# Patient Record
Sex: FEMALE | Race: BLACK OR AFRICAN AMERICAN | NOT HISPANIC OR LATINO | URBAN - METROPOLITAN AREA
[De-identification: names, ages, dates, MRNs, and addresses within clinical notes are randomized per-mention and may not be internally consistent; named-entity substitution may affect disease eponyms.]

---

## 2017-05-15 ENCOUNTER — INPATIENT (INPATIENT)
Facility: HOSPITAL | Age: 82
LOS: 2 days | Discharge: HOME | DRG: 440 | End: 2017-05-18
Attending: INTERNAL MEDICINE | Admitting: HOSPITALIST
Payer: MEDICAID

## 2017-05-15 VITALS
OXYGEN SATURATION: 98 % | HEART RATE: 110 BPM | RESPIRATION RATE: 18 BRPM | DIASTOLIC BLOOD PRESSURE: 65 MMHG | SYSTOLIC BLOOD PRESSURE: 136 MMHG | WEIGHT: 149.91 LBS | TEMPERATURE: 99 F

## 2017-05-15 DIAGNOSIS — Z86.69 PERSONAL HISTORY OF OTHER DISEASES OF THE NERVOUS SYSTEM AND SENSE ORGANS: Chronic | ICD-10-CM

## 2017-05-15 DIAGNOSIS — R07.9 CHEST PAIN, UNSPECIFIED: ICD-10-CM

## 2017-05-15 LAB
ALBUMIN SERPL ELPH-MCNC: 3.7 G/DL — SIGNIFICANT CHANGE UP (ref 3.3–5)
ALP SERPL-CCNC: 48 U/L — SIGNIFICANT CHANGE UP (ref 40–120)
ALT FLD-CCNC: 39 U/L — SIGNIFICANT CHANGE UP (ref 12–78)
ANION GAP SERPL CALC-SCNC: 11 MMOL/L — SIGNIFICANT CHANGE UP (ref 5–17)
AST SERPL-CCNC: 34 U/L — SIGNIFICANT CHANGE UP (ref 15–37)
BASOPHILS # BLD AUTO: 0.1 K/UL — SIGNIFICANT CHANGE UP (ref 0–0.2)
BASOPHILS NFR BLD AUTO: 0.9 % — SIGNIFICANT CHANGE UP (ref 0–2)
BILIRUB SERPL-MCNC: 0.6 MG/DL — SIGNIFICANT CHANGE UP (ref 0.2–1.2)
BUN SERPL-MCNC: 29 MG/DL — HIGH (ref 7–23)
CALCIUM SERPL-MCNC: 9.8 MG/DL — SIGNIFICANT CHANGE UP (ref 8.5–10.1)
CHLORIDE SERPL-SCNC: 102 MMOL/L — SIGNIFICANT CHANGE UP (ref 96–108)
CK SERPL-CCNC: 129 U/L — SIGNIFICANT CHANGE UP (ref 26–192)
CO2 SERPL-SCNC: 25 MMOL/L — SIGNIFICANT CHANGE UP (ref 22–31)
CREAT SERPL-MCNC: 0.74 MG/DL — SIGNIFICANT CHANGE UP (ref 0.5–1.3)
EOSINOPHIL # BLD AUTO: 0 K/UL — SIGNIFICANT CHANGE UP (ref 0–0.5)
EOSINOPHIL NFR BLD AUTO: 0.3 % — SIGNIFICANT CHANGE UP (ref 0–6)
GLUCOSE SERPL-MCNC: 105 MG/DL — HIGH (ref 70–99)
HCT VFR BLD CALC: 39.7 % — SIGNIFICANT CHANGE UP (ref 34.5–45)
HGB BLD-MCNC: 12.9 G/DL — SIGNIFICANT CHANGE UP (ref 11.5–15.5)
INR BLD: 1.09 RATIO — SIGNIFICANT CHANGE UP (ref 0.88–1.16)
LIDOCAIN IGE QN: 563 U/L — HIGH (ref 73–393)
LYMPHOCYTES # BLD AUTO: 2.3 K/UL — SIGNIFICANT CHANGE UP (ref 1–3.3)
LYMPHOCYTES # BLD AUTO: 39.3 % — SIGNIFICANT CHANGE UP (ref 13–44)
MCHC RBC-ENTMCNC: 29.3 PG — SIGNIFICANT CHANGE UP (ref 27–34)
MCHC RBC-ENTMCNC: 32.6 GM/DL — SIGNIFICANT CHANGE UP (ref 32–36)
MCV RBC AUTO: 90 FL — SIGNIFICANT CHANGE UP (ref 80–100)
MONOCYTES # BLD AUTO: 0.8 K/UL — SIGNIFICANT CHANGE UP (ref 0–0.9)
MONOCYTES NFR BLD AUTO: 13.2 % — HIGH (ref 1–9)
NEUTROPHILS # BLD AUTO: 2.7 K/UL — SIGNIFICANT CHANGE UP (ref 1.8–7.4)
NEUTROPHILS NFR BLD AUTO: 46.2 % — SIGNIFICANT CHANGE UP (ref 43–77)
PLATELET # BLD AUTO: 177 K/UL — SIGNIFICANT CHANGE UP (ref 150–400)
POTASSIUM SERPL-MCNC: 3.8 MMOL/L — SIGNIFICANT CHANGE UP (ref 3.5–5.3)
POTASSIUM SERPL-SCNC: 3.8 MMOL/L — SIGNIFICANT CHANGE UP (ref 3.5–5.3)
PROT SERPL-MCNC: 7.6 G/DL — SIGNIFICANT CHANGE UP (ref 6–8.3)
PROTHROM AB SERPL-ACNC: 11.9 SEC — SIGNIFICANT CHANGE UP (ref 9.8–12.7)
RBC # BLD: 4.41 M/UL — SIGNIFICANT CHANGE UP (ref 3.8–5.2)
RBC # FLD: 10.4 % — SIGNIFICANT CHANGE UP (ref 10.3–14.5)
SODIUM SERPL-SCNC: 138 MMOL/L — SIGNIFICANT CHANGE UP (ref 135–145)
TROPONIN I SERPL-MCNC: 0.03 NG/ML — SIGNIFICANT CHANGE UP (ref 0.01–0.04)
WBC # BLD: 5.9 K/UL — SIGNIFICANT CHANGE UP (ref 3.8–10.5)
WBC # FLD AUTO: 5.9 K/UL — SIGNIFICANT CHANGE UP (ref 3.8–10.5)

## 2017-05-15 PROCEDURE — 99285 EMERGENCY DEPT VISIT HI MDM: CPT

## 2017-05-15 PROCEDURE — 71275 CT ANGIOGRAPHY CHEST: CPT | Mod: 26

## 2017-05-15 PROCEDURE — 99223 1ST HOSP IP/OBS HIGH 75: CPT | Mod: AI,GC

## 2017-05-15 PROCEDURE — 93010 ELECTROCARDIOGRAM REPORT: CPT

## 2017-05-15 PROCEDURE — 71010: CPT | Mod: 26

## 2017-05-15 PROCEDURE — 74177 CT ABD & PELVIS W/CONTRAST: CPT | Mod: 26

## 2017-05-15 RX ORDER — LISINOPRIL 2.5 MG/1
1 TABLET ORAL
Qty: 0 | Refills: 0 | COMMUNITY

## 2017-05-15 RX ORDER — PREGABALIN 225 MG/1
1000 CAPSULE ORAL DAILY
Qty: 0 | Refills: 0 | Status: DISCONTINUED | OUTPATIENT
Start: 2017-05-15 | End: 2017-05-18

## 2017-05-15 RX ORDER — LISINOPRIL 2.5 MG/1
5 TABLET ORAL DAILY
Qty: 0 | Refills: 0 | Status: DISCONTINUED | OUTPATIENT
Start: 2017-05-15 | End: 2017-05-18

## 2017-05-15 RX ORDER — ERGOCALCIFEROL 1.25 MG/1
1 CAPSULE ORAL
Qty: 0 | Refills: 0 | COMMUNITY

## 2017-05-15 RX ORDER — PREGABALIN 225 MG/1
1 CAPSULE ORAL
Qty: 0 | Refills: 0 | COMMUNITY

## 2017-05-15 NOTE — H&P ADULT - ATTENDING COMMENTS
80yo f pmh stated above being admitted to rule out ACS and rule out pancreatitis.  NPO.  Cardio consulted.  Trend troponins.

## 2017-05-15 NOTE — ED ADULT NURSE NOTE - ED STAT RN HANDOFF DETAILS
hand off report given to Dariel FAROOQ for continuation of care. awaiting for bed placement and admission orders.

## 2017-05-15 NOTE — ED ADULT TRIAGE NOTE - CHIEF COMPLAINT QUOTE
chest pain, shortness of breath and dizziness for one week. patient denies headache, blurred vision, fever, vomiting.

## 2017-05-15 NOTE — ED PROVIDER NOTE - OBJECTIVE STATEMENT
81 female creole speaking, son at the bedside, translates, presents to ER c/o left sided chest pain associated with shortness of breath, abdominal discomfort, decreased appetite, dizziness for the past week, seems to be getting worse.

## 2017-05-15 NOTE — H&P ADULT - ASSESSMENT
80 y/o female with PMHx HTN, HLD, early menopause at age 31, b/l cataracts admitted with generalized abdominal pain and left chest pain possibly 2/2 pancreatitis, r/o ACS.

## 2017-05-15 NOTE — ED ADULT NURSE NOTE - OBJECTIVE STATEMENT
pt came in ED from home with c/o pain in the left side of the chest X 1 week. alert and oriented X 3. Creole speaking, son at the bedside to do the translation. afebrile. non-labored respiration noted. abdomen nondistended, nontender.

## 2017-05-15 NOTE — H&P ADULT - PROBLEM SELECTOR PLAN 3
possibly secondary to pain, as HR increased significantly with abdominal palpation  CT angio was done to rule out PE as cause of tachycardia and MCKENZIE  monitor on telemetry, check repeat EKG in AM

## 2017-05-15 NOTE — ED PROVIDER NOTE - CARE PLAN
Principal Discharge DX:	Chest pain, unspecified type  Secondary Diagnosis:	Tachycardia  Secondary Diagnosis:	Periumbilical abdominal pain

## 2017-05-15 NOTE — H&P ADULT - PROBLEM SELECTOR PLAN 2
patient has history of early menopause and thickened endometrium on CT Abd/Pelvis  consider uterine pathology workup with dedicated imaging  elevated lipase suggestive of pancreatitis, may have trended down due to patient's decreased PO intake and bowel rest over the last week  treat presumptively with NPO, IVF NS at 75 cc/hr for 12hrs  trend amylase and lipase; obtain lipid panel in AM

## 2017-05-15 NOTE — H&P ADULT - NSHPPHYSICALEXAM_GEN_ALL_CORE
General: obese AA female in NAD  HEENT: NC/AT; PERRL, EOMI, +b/l cataracts; neck supple, +JVD, +hepatojugular reflex, +auscultated carotid bruits bilaterally  Cardiac: +S1S2, tachycardic, no murmurs noted  Respiratory: Clear to auscultation bilaterally  Abdomen: soft, nondistended, +TTP periumbilical region with some guarding  Extremities: no clubbing or cyanosis; 1+ pitting edema  MSK: normal motor strength in all extremities, no decreased ROM  Skin: no suspicious lesions or visible rashes noted  Neuro: AAOx3, nonfocal exam; BELL x 4  Psych: appropriate mood and affect

## 2017-05-15 NOTE — H&P ADULT - PROBLEM SELECTOR PLAN 1
admit to TELE  r/o ACS, trend cardiac enzymes; repeat EKG in AM, initial EKG showed sinus tachy  cardiology (Dr. Griggs) consulted f/u recs; consider echocardiogram  unclear if source of pain is cardiac in nature; possibly 2/2 pancreatitis  consider workup of carotid bruits with US carotids admit to TELE  given full dose aspirin x 1  r/o ACS, trend cardiac enzymes; repeat EKG in AM, initial EKG showed sinus tachy  cardiology (Dr. Griggs) consulted f/u recs; consider echocardiogram  unclear if source of pain is cardiac in nature; possibly 2/2 pancreatitis  consider workup of carotid bruits with US carotids

## 2017-05-15 NOTE — H&P ADULT - NSHPSOCIALHISTORY_GEN_ALL_CORE
, lives at home with sons  Ambulates independently; independent of ADLs  Never Smoker  Denies ETOH use    Flu shot: Yes  PNA shot: Yes

## 2017-05-15 NOTE — ED ADULT NURSE NOTE - CHPI ED SYMPTOMS NEG
no shortness of breath/no chills/no vomiting/no diaphoresis/no syncope/no back pain/no nausea/no dizziness/no fever

## 2017-05-16 DIAGNOSIS — K59.00 CONSTIPATION, UNSPECIFIED: ICD-10-CM

## 2017-05-16 DIAGNOSIS — R10.33 PERIUMBILICAL PAIN: ICD-10-CM

## 2017-05-16 DIAGNOSIS — R00.0 TACHYCARDIA, UNSPECIFIED: ICD-10-CM

## 2017-05-16 DIAGNOSIS — I10 ESSENTIAL (PRIMARY) HYPERTENSION: ICD-10-CM

## 2017-05-16 DIAGNOSIS — R07.9 CHEST PAIN, UNSPECIFIED: ICD-10-CM

## 2017-05-16 DIAGNOSIS — Z29.9 ENCOUNTER FOR PROPHYLACTIC MEASURES, UNSPECIFIED: ICD-10-CM

## 2017-05-16 LAB
AMYLASE P1 CFR SERPL: 86 U/L — SIGNIFICANT CHANGE UP (ref 25–115)
ANION GAP SERPL CALC-SCNC: 10 MMOL/L — SIGNIFICANT CHANGE UP (ref 5–17)
BASOPHILS # BLD AUTO: 0 K/UL — SIGNIFICANT CHANGE UP (ref 0–0.2)
BASOPHILS NFR BLD AUTO: 0.7 % — SIGNIFICANT CHANGE UP (ref 0–2)
BUN SERPL-MCNC: 26 MG/DL — HIGH (ref 7–23)
CALCIUM SERPL-MCNC: 9.4 MG/DL — SIGNIFICANT CHANGE UP (ref 8.5–10.1)
CHLORIDE SERPL-SCNC: 104 MMOL/L — SIGNIFICANT CHANGE UP (ref 96–108)
CHOLEST SERPL-MCNC: 121 MG/DL — SIGNIFICANT CHANGE UP (ref 10–199)
CK MB BLD-MCNC: 1.1 % — SIGNIFICANT CHANGE UP (ref 0–3.5)
CK MB BLD-MCNC: 1.2 % — SIGNIFICANT CHANGE UP (ref 0–3.5)
CK MB CFR SERPL CALC: 0.9 NG/ML — SIGNIFICANT CHANGE UP (ref 0–3.6)
CK MB CFR SERPL CALC: 1.2 NG/ML — SIGNIFICANT CHANGE UP (ref 0–3.6)
CK SERPL-CCNC: 101 U/L — SIGNIFICANT CHANGE UP (ref 26–192)
CK SERPL-CCNC: 81 U/L — SIGNIFICANT CHANGE UP (ref 26–192)
CO2 SERPL-SCNC: 28 MMOL/L — SIGNIFICANT CHANGE UP (ref 22–31)
CREAT SERPL-MCNC: 0.74 MG/DL — SIGNIFICANT CHANGE UP (ref 0.5–1.3)
EOSINOPHIL # BLD AUTO: 0 K/UL — SIGNIFICANT CHANGE UP (ref 0–0.5)
EOSINOPHIL NFR BLD AUTO: 0.2 % — SIGNIFICANT CHANGE UP (ref 0–6)
GLUCOSE SERPL-MCNC: 93 MG/DL — SIGNIFICANT CHANGE UP (ref 70–99)
HCT VFR BLD CALC: 36.1 % — SIGNIFICANT CHANGE UP (ref 34.5–45)
HDLC SERPL-MCNC: 39 MG/DL — LOW (ref 40–125)
HGB BLD-MCNC: 11.8 G/DL — SIGNIFICANT CHANGE UP (ref 11.5–15.5)
LIDOCAIN IGE QN: 546 U/L — HIGH (ref 73–393)
LIPID PNL WITH DIRECT LDL SERPL: 67 MG/DL — SIGNIFICANT CHANGE UP
LYMPHOCYTES # BLD AUTO: 1.9 K/UL — SIGNIFICANT CHANGE UP (ref 1–3.3)
LYMPHOCYTES # BLD AUTO: 28.8 % — SIGNIFICANT CHANGE UP (ref 13–44)
MCHC RBC-ENTMCNC: 29 PG — SIGNIFICANT CHANGE UP (ref 27–34)
MCHC RBC-ENTMCNC: 32.6 GM/DL — SIGNIFICANT CHANGE UP (ref 32–36)
MCV RBC AUTO: 88.9 FL — SIGNIFICANT CHANGE UP (ref 80–100)
MONOCYTES # BLD AUTO: 0.7 K/UL — SIGNIFICANT CHANGE UP (ref 0–0.9)
MONOCYTES NFR BLD AUTO: 11.3 % — HIGH (ref 1–9)
NEUTROPHILS # BLD AUTO: 3.9 K/UL — SIGNIFICANT CHANGE UP (ref 1.8–7.4)
NEUTROPHILS NFR BLD AUTO: 58.9 % — SIGNIFICANT CHANGE UP (ref 43–77)
PLATELET # BLD AUTO: 160 K/UL — SIGNIFICANT CHANGE UP (ref 150–400)
POTASSIUM SERPL-MCNC: 3.9 MMOL/L — SIGNIFICANT CHANGE UP (ref 3.5–5.3)
POTASSIUM SERPL-SCNC: 3.9 MMOL/L — SIGNIFICANT CHANGE UP (ref 3.5–5.3)
RBC # BLD: 4.06 M/UL — SIGNIFICANT CHANGE UP (ref 3.8–5.2)
RBC # FLD: 10.6 % — SIGNIFICANT CHANGE UP (ref 10.3–14.5)
SODIUM SERPL-SCNC: 142 MMOL/L — SIGNIFICANT CHANGE UP (ref 135–145)
TOTAL CHOLESTEROL/HDL RATIO MEASUREMENT: 3 RATIO — LOW (ref 3.3–7.1)
TRIGL SERPL-MCNC: 76 MG/DL — SIGNIFICANT CHANGE UP (ref 10–149)
TROPONIN I SERPL-MCNC: 0.04 NG/ML — SIGNIFICANT CHANGE UP (ref 0.01–0.04)
TROPONIN I SERPL-MCNC: 0.05 NG/ML — HIGH (ref 0.01–0.04)
WBC # BLD: 6.6 K/UL — SIGNIFICANT CHANGE UP (ref 3.8–10.5)
WBC # FLD AUTO: 6.6 K/UL — SIGNIFICANT CHANGE UP (ref 3.8–10.5)

## 2017-05-16 PROCEDURE — 76705 ECHO EXAM OF ABDOMEN: CPT | Mod: 26

## 2017-05-16 PROCEDURE — 99233 SBSQ HOSP IP/OBS HIGH 50: CPT | Mod: GC

## 2017-05-16 PROCEDURE — 99255 IP/OBS CONSLTJ NEW/EST HI 80: CPT

## 2017-05-16 PROCEDURE — 93010 ELECTROCARDIOGRAM REPORT: CPT

## 2017-05-16 PROCEDURE — 74183 MRI ABD W/O CNTR FLWD CNTR: CPT | Mod: 26

## 2017-05-16 RX ORDER — ENOXAPARIN SODIUM 100 MG/ML
40 INJECTION SUBCUTANEOUS EVERY 24 HOURS
Qty: 0 | Refills: 0 | Status: DISCONTINUED | OUTPATIENT
Start: 2017-05-16 | End: 2017-05-18

## 2017-05-16 RX ORDER — ACETAMINOPHEN 500 MG
650 TABLET ORAL EVERY 6 HOURS
Qty: 0 | Refills: 0 | Status: DISCONTINUED | OUTPATIENT
Start: 2017-05-16 | End: 2017-05-18

## 2017-05-16 RX ORDER — DOCUSATE SODIUM 100 MG
100 CAPSULE ORAL DAILY
Qty: 0 | Refills: 0 | Status: DISCONTINUED | OUTPATIENT
Start: 2017-05-16 | End: 2017-05-18

## 2017-05-16 RX ORDER — ASPIRIN/CALCIUM CARB/MAGNESIUM 324 MG
325 TABLET ORAL ONCE
Qty: 0 | Refills: 0 | Status: COMPLETED | OUTPATIENT
Start: 2017-05-16 | End: 2017-05-16

## 2017-05-16 RX ORDER — ASPIRIN/CALCIUM CARB/MAGNESIUM 324 MG
81 TABLET ORAL DAILY
Qty: 0 | Refills: 0 | Status: DISCONTINUED | OUTPATIENT
Start: 2017-05-16 | End: 2017-05-18

## 2017-05-16 RX ORDER — SODIUM CHLORIDE 9 MG/ML
1000 INJECTION, SOLUTION INTRAVENOUS
Qty: 0 | Refills: 0 | Status: DISCONTINUED | OUTPATIENT
Start: 2017-05-16 | End: 2017-05-17

## 2017-05-16 RX ORDER — SODIUM CHLORIDE 9 MG/ML
1000 INJECTION INTRAMUSCULAR; INTRAVENOUS; SUBCUTANEOUS
Qty: 0 | Refills: 0 | Status: DISCONTINUED | OUTPATIENT
Start: 2017-05-16 | End: 2017-05-16

## 2017-05-16 RX ORDER — POLYETHYLENE GLYCOL 3350 17 G/17G
17 POWDER, FOR SOLUTION ORAL DAILY
Qty: 0 | Refills: 0 | Status: DISCONTINUED | OUTPATIENT
Start: 2017-05-16 | End: 2017-05-18

## 2017-05-16 RX ADMIN — ENOXAPARIN SODIUM 40 MILLIGRAM(S): 100 INJECTION SUBCUTANEOUS at 12:34

## 2017-05-16 RX ADMIN — POLYETHYLENE GLYCOL 3350 17 GRAM(S): 17 POWDER, FOR SOLUTION ORAL at 12:23

## 2017-05-16 RX ADMIN — SODIUM CHLORIDE 150 MILLILITER(S): 9 INJECTION, SOLUTION INTRAVENOUS at 19:51

## 2017-05-16 RX ADMIN — SODIUM CHLORIDE 75 MILLILITER(S): 9 INJECTION INTRAMUSCULAR; INTRAVENOUS; SUBCUTANEOUS at 01:21

## 2017-05-16 RX ADMIN — LISINOPRIL 5 MILLIGRAM(S): 2.5 TABLET ORAL at 06:46

## 2017-05-16 RX ADMIN — Medication 81 MILLIGRAM(S): at 12:34

## 2017-05-16 RX ADMIN — SODIUM CHLORIDE 150 MILLILITER(S): 9 INJECTION, SOLUTION INTRAVENOUS at 12:24

## 2017-05-16 RX ADMIN — Medication 100 MILLIGRAM(S): at 12:23

## 2017-05-16 RX ADMIN — Medication 325 MILLIGRAM(S): at 00:32

## 2017-05-16 RX ADMIN — PREGABALIN 1000 MICROGRAM(S): 225 CAPSULE ORAL at 12:23

## 2017-05-16 NOTE — DISCHARGE NOTE ADULT - PLAN OF CARE
Resolution Control Blood Pressure levels Control Cholesterol levels Prevent symptoms Continue with metoprolol and hydrochlorothiazide. Follow up with your Primary care doctor. Low fat, DASH diet. Follow up with your Primary care doctor. Continue with metoprolol. Follow up with your Primary care doctor. Encourage low fat diet, avoid alcoholic beverages. Follow up with Gastroenterologist, Dr. Martins. Unlikely cardiac source, cardiac enzymes trended down. Follow up with your Primary Care Doctor. Possible source of tachycardia. Resolved Continue with lisinopril. Follow up with your Primary care doctor. Low fat, DASH diet. Follow up with your Primary care doctor.  start lipitor 10mg daily at night likely 2/2 to hyperthyoidism  Continue with metoprolol 25mg daily. Follow up with your Primary care doctor. likely source tachycardia.  cont methimazole 20mg daily  cont metoprolol 25mg daily  recheck thyroid tests 2-3 weeks after discharge  f/u at [facility] for radioactive scan as outpatient You were found to have thickening of the lining of your uterus on a CT scan. Follow up with your PMD to set you up with a gynecologist for a possible transvaginal ultrasound and/or for an endometrial biopsy in the office. Encourage low fat diet, avoid alcoholic beverages. Follow up with Gastroenterologist, Dr. Martins (phone number below) within 1-2 weeks for your elevated lipase level and possibly to evaluate your stomach. Continue famotidine until that visit and readdress with him if need to continue. likely source of tachycardia  cont methimazole 20mg daily  cont metoprolol 25mg daily  recheck thyroid tests 2-3 weeks after discharge  follow up at [facility] for your hyperthyroidism. Call [phone] for an appointment. You will need a radioactive iodine scan as outpatient and also to recheck your thyroid function blood tests in 2-3 weeks. Take the new medications metoprolol 25mg daily and methimazole 20mg daily starting tomorrow 05/18/17. Low fat, low cholesterol diet. Follow up with your Primary care doctor.  start lipitor 10mg daily at night Unlikely cardiac source, cardiac enzymes negative. Follow up with your Primary Care Doctor. You have some tightening in your left internal carotid artery (likely ~50%). Start taking atorvastatin and aspirin as prescribed. Follow up with your primary care doctor within a week.

## 2017-05-16 NOTE — DISCHARGE NOTE ADULT - PATIENT PORTAL LINK FT
“You can access the [facility] Patient Portal, offered by [facility], by registering with the following website: [email or web address]”

## 2017-05-16 NOTE — PROGRESS NOTE ADULT - PROBLEM SELECTOR PLAN 1
-admit to tele to monitor  -pt still in sinus tachycardia  -CE downtrended  -f/u cardio recs  -f/u TTE -admit to tele to monitor  -resolved since admission  -pt still in sinus tachycardia  -CE downtrended  -f/u cardio recs  -f/u TTE report -admit to tele to monitor  -resolved since admission  -pt still in sinus tachycardia likely 2/2 dehydration  -CE negative  -f/u cardio recs  -f/u TTE report

## 2017-05-16 NOTE — PROGRESS NOTE ADULT - SUBJECTIVE AND OBJECTIVE BOX
Patient is a 81y old  Female who presents with a chief complaint of abdominal pain/chest pain (16 May 2017 07:47)      INTERVAL HPI/OVERNIGHT EVENTS: Pt reports her chest pain has resolved. Pt states she currently has no abdominal pain at rest. Pt admits to decreased PO intake for the last week, and no BM for the last few days. Pt denies SOB. Per tele monitor, pt has been in sinus tachycardia, with the highest HR up to 140-150s while she goes to the bathroom.    MEDICATIONS  (STANDING):  lisinopril 5milliGRAM(s) Oral daily  cyanocobalamin 1000MICROGram(s) Oral daily  enoxaparin Injectable 40milliGRAM(s) SubCutaneous every 24 hours  aspirin enteric coated 81milliGRAM(s) Oral daily  lactated ringers. 1000milliLiter(s) IV Continuous <Continuous>  docusate sodium 100milliGRAM(s) Oral daily  polyethylene glycol 3350 17Gram(s) Oral daily    MEDICATIONS  (PRN):  acetaminophen   Tablet. 650milliGRAM(s) Oral every 6 hours PRN Mild Pain (1 - 3)      Allergies    No Known Allergies    Intolerances        REVIEW OF SYSTEMS:  CONSTITUTIONAL: No fever, no chills, no myalgias  HEENT: no HA, no sore throat, no blurry vision  RESPIRATORY: No cough, no wheezing, no SOB  CARDIOVASCULAR: No chest pain, no palpitations  GASTROINTESTINAL: No abdominal pain, no nausea, no vomiting; no diarrhea, +constipation.   GENITOURINARY: No dysuria, No hematuria.   EXT: no edema, no cyanosis, no open wounds, no calf tenderness  NEUROLOGICAL: alert and oriented x 3,  No headaches  MUSCULOSKELETAL: no body aches or joint pain  ROS  [ ] Unable to obtain     Height (cm): 157.5 (05-16 @ 02:35)  Weight (kg): 60 (05-16 @ 02:35)  BMI (kg/m2): 24.2 (05-16 @ 02:35)  BSA (m2): 1.6 (05-16 @ 02:35)  Vital Signs Last 24 Hrs  T(C): 37, Max: 37.1 (05-15 @ 17:55)  T(F): 98.6, Max: 98.8 (05-15 @ 17:55)  HR: 101 (98 - 110)  BP: 130/70 (113/63 - 143/72)  BP(mean): --  RR: 17 (15 - 18)  SpO2: 93% (93% - 98%)      PHYSICAL EXAM:  GENERAL:  No acute distresss, well appearing, [ ] Agitated, [ ] Lethargy, [ ] confused   ENMT: PERRL, EOMI, conjuctiva clear  NEURO:  Alert & Oriented X3, no focal deficits [ ]Confusion  [ ] Encephalopathic [ ] Sedated [ ] Other  RESPIRATORY: Clear to auscultation bilaterally,  [ ] decreased breath sounds at bases  [ ] wheezing   [ ] rhonchi  [ ] crackles  CARDIOVASCULAR: RRR, No murmurs, +S1, S2  [ ] irregular   ABDOMEN:  soft, bloated, +mild distention at epigastric region, +BS x 4  EXTREMITIES: no edema or cyanosis      LABS:                        11.8   6.6   )-----------( 160      ( 16 May 2017 05:58 )             36.1     16 May 2017 05:58    142    |  104    |  26     ----------------------------<  93     3.9     |  28     |  0.74     Ca    9.4        16 May 2017 05:58    TPro  7.6    /  Alb  3.7    /  TBili  0.6    /  DBili  x      /  AST  34     /  ALT  39     /  AlkPhos  48     15 May 2017 18:53    PT/INR - ( 15 May 2017 18:53 )   PT: 11.9 sec;   INR: 1.09 ratio               CAPILLARY BLOOD GLUCOSE    Cultures          RADIOLOGY & ADDITIONAL TESTS: personally reviewed.  CONSULTANT NOTES: personally reviewed, appreciate recommendations to coordinate plan of care.      Care Discussed with: [X ] Patient  [ ] Family  [X] RN  DVT prophylaxis: [ X] SQ Lovenox   [ ] SQ Heparin  [ ] Coumadin  [ ] SCDs [ ] ambulating frequently/pt low risk [ ] contraindicated            [ ] other Patient is a 81y old  Female who presents with a chief complaint of abdominal pain/chest pain (16 May 2017 07:47)      INTERVAL HPI/OVERNIGHT EVENTS: Pt reports her chest pain has resolved. Pt states she currently has no abdominal pain at rest. Pt admits to decreased PO intake for the last week, and no BM for the last few days. Pt denies SOB. Per tele monitor, pt has been in sinus tachycardia, with the highest HR up to 140-150s while she goes to the bathroom.    MEDICATIONS  (STANDING):  lisinopril 5milliGRAM(s) Oral daily  cyanocobalamin 1000MICROGram(s) Oral daily  enoxaparin Injectable 40milliGRAM(s) SubCutaneous every 24 hours  aspirin enteric coated 81milliGRAM(s) Oral daily  lactated ringers. 1000milliLiter(s) IV Continuous <Continuous>  docusate sodium 100milliGRAM(s) Oral daily  polyethylene glycol 3350 17Gram(s) Oral daily    MEDICATIONS  (PRN):  acetaminophen   Tablet. 650milliGRAM(s) Oral every 6 hours PRN Mild Pain (1 - 3)      Allergies    No Known Allergies    Intolerances        REVIEW OF SYSTEMS:  CONSTITUTIONAL: No fever, no chills, no myalgias  HEENT: no HA, no sore throat, no blurry vision  RESPIRATORY: No cough, no wheezing, no SOB  CARDIOVASCULAR: No chest pain, no palpitations  GASTROINTESTINAL: Had abd pain until yesterday evening. Today no abdominal pain, no nausea, no vomiting; no diarrhea, +constipation.   GENITOURINARY: No dysuria, No hematuria.   EXT: no edema, no cyanosis, no open wounds, no calf tenderness  NEUROLOGICAL:  No headaches  MUSCULOSKELETAL: no body aches or joint pain  ROS  [ ] Unable to obtain     Height (cm): 157.5 (05-16 @ 02:35)  Weight (kg): 60 (05-16 @ 02:35)  BMI (kg/m2): 24.2 (05-16 @ 02:35)  BSA (m2): 1.6 (05-16 @ 02:35)  Vital Signs Last 24 Hrs  T(C): 37, Max: 37.1 (05-15 @ 17:55)  T(F): 98.6, Max: 98.8 (05-15 @ 17:55)  HR: 101 (98 - 110)  BP: 130/70 (113/63 - 143/72)  BP(mean): --  RR: 17 (15 - 18)  SpO2: 93% (93% - 98%)      PHYSICAL EXAM:  GENERAL:  No acute distress, [ ] Agitated, [ ] Lethargy, [ ] confused   ENMT: PERRL, EOMI, conjunctiva clear  NEURO:  answering questions and following commands appropriately, no focal deficits [ ]Confusion  [ ] Encephalopathic [ ] Sedated [ ] Other  RESPIRATORY: Clear to auscultation bilaterally,  [ ] decreased breath sounds at bases  [ ] wheezing   [ ] rhonchi  [ ] crackles  CARDIOVASCULAR: RRR, No murmurs, +S1, S2  [ ] irregular   ABDOMEN:  soft, +mild tenderness to palpation at epigastric region, +BS x 4  EXTREMITIES: no edema or cyanosis      LABS:                        11.8   6.6   )-----------( 160      ( 16 May 2017 05:58 )             36.1     16 May 2017 05:58    142    |  104    |  26     ----------------------------<  93     3.9     |  28     |  0.74     Ca    9.4        16 May 2017 05:58    TPro  7.6    /  Alb  3.7    /  TBili  0.6    /  DBili  x      /  AST  34     /  ALT  39     /  AlkPhos  48     15 May 2017 18:53    PT/INR - ( 15 May 2017 18:53 )   PT: 11.9 sec;   INR: 1.09 ratio         RADIOLOGY & ADDITIONAL TESTS: personally reviewed.  CONSULTANT NOTES: personally reviewed, appreciate recommendations to coordinate plan of care.      Care Discussed with: [X ] Patient  [ ] Family  [X] RN  DVT prophylaxis: [ X] SQ Lovenox   [ ] SQ Heparin  [ ] Coumadin  [ ] SCDs [ ] ambulating frequently/pt low risk [ ] contraindicated            [ ] other

## 2017-05-16 NOTE — DISCHARGE NOTE ADULT - ADDITIONAL INSTRUCTIONS
-f/u with [facility] at [phone] for radioactive scan as outpatient  -recheck TFTs 2-3 weeks after discharge  -you are now started on metoprolol 25mg daily and methimazole 20mg daily for hyperthyroidism  -you are started on lipitor 10mg daily at night for high cholesterol  -f/u outpatient with GI-get CMP and lipase checked in 1 week  -f/u with outpatient OB/GYN for thickened endometrium Pulmonary hypertension: you have moderate to severe pulmonary hypertension as per echocardiogram done in the hospital. Follow up with your PMD within a week.

## 2017-05-16 NOTE — DISCHARGE NOTE ADULT - CARE PROVIDER_API CALL
Bharati Malhotra- Dr. Bharati Malhotra  Phone: (   )    -  Fax: (   )    - Bharati Malhotra  PMGEORGE- Dr. Bharati Malhotra  Phone: (   )    -  Fax: (   )    -    Marty Martins (DO), Gastroenterology; Internal Medicine  [address] [place] [address]  Phone: [phone]  Fax: [phone]

## 2017-05-16 NOTE — DISCHARGE NOTE ADULT - HOSPITAL COURSE
80 y/o female with PMHx HTN, HLD, early menopause at age 31, b/l cataracts presented with h/o one week prior to admission generalized abdominal pain and left chest pain. Admited to dyspnea on exertion while walking up and down the stairs. Chest pain was non-radiating and 6/10 in intensity. Had chronic left shoulder pain. Patient's son (Adarsh) was at bedside translating and also stated that patient had decreased appetite which improved the day prior to admission but returned on day of admission. Patient stated that abdominal discomfort improved with burping. Had one episode of NBNB vomiting 4 days prior to admission. Admited to a sensation of a pulsatile thumping in her left ear. Denied palpitations, nausea, diarrhea, vaginal bleeding, BRBPR, melena.    In the ED, patient's labs significant for elevated lipase of 593, first set CE's neg. EKG showed sinus tachycardia. CXR showed no focal consolidation or pleural effusion. CT Angio Chest was done to r/o PE: showed no central PE. CT abd/pelvis showed thickened endometrium, measuring 1.4 cm.    In Hospital Course, Cardio - Dr. MIAH Delong, was consulted. Pt had an initial increase in troponin from 0.028 to 0.052, but then it trended down from 0.052 to 0.043. 82 y/o female with PMHx HTN, HLD, early menopause at age 31, b/l cataracts presented with h/o one week prior to admission generalized abdominal pain and left chest pain. Admited to dyspnea on exertion while walking up and down the stairs. Chest pain was non-radiating and 6/10 in intensity. Had chronic left shoulder pain. Patient's son (Adarsh) was at bedside translating and also stated that patient had decreased appetite which improved the day prior to admission but returned on day of admission. Patient stated that abdominal discomfort improved with burping. Had one episode of NBNB vomiting 4 days prior to admission. Admited to a sensation of a pulsatile thumping in her left ear. Denied palpitations, nausea, diarrhea, vaginal bleeding, BRBPR, melena.    In the ED, patient's labs significant for elevated lipase of 593, first set CE's neg. EKG showed sinus tachycardia. CXR showed no focal consolidation or pleural effusion. CT Angio Chest was done to r/o PE: showed no central PE. CT abd/pelvis showed thickened endometrium, measuring 1.4 cm.    In Hospital Course, Cardio - Dr. MIAH Delong, was consulted. Pt had an initial increase in troponin from 0.028 to 0.052, but then it trended down from 0.052 to 0.043. Pt was also seen by Gastroenterologist, Dr. Martins, for pancreatitis.     5/16/17 US gallbladder -  Unremarkable gallbladder ultrasound. Pt also had MRI abdomen showing: No common bile duct calculus demonstrated, No MR evidence for pancreatitis, Innumerable subcentimeter probably benign splenic lesions of uncertain etiology.    5/17/17 Pt outpatient PMD notified Hospitalist team that pt had elevated Thyroid function results on outpatient labs, which may be a probable source of tachycardia for patient. Endocrinologist, Dr. Perlman, was consulted. 80 y/o female with PMHx HTN, HLD, early menopause at age 31, b/l cataracts presented with h/o one week prior to admission generalized abdominal pain and left chest pain. Admited to dyspnea on exertion while walking up and down the stairs. Chest pain was non-radiating and 6/10 in intensity. Had chronic left shoulder pain. Patient's son (Adarsh) was at bedside translating and also stated that patient had decreased appetite which improved the day prior to admission but returned on day of admission. Patient stated that abdominal discomfort improved with burping. Had one episode of NBNB vomiting 4 days prior to admission. Admited to a sensation of a pulsatile thumping in her left ear. Denied palpitations, nausea, diarrhea, vaginal bleeding, BRBPR, melena.    In the ED, patient's labs significant for elevated lipase of 593, first set CE's neg. EKG showed sinus tachycardia. CXR showed no focal consolidation or pleural effusion. CT Angio Chest was done to r/o PE: showed no central PE. CT abd/pelvis showed thickened endometrium, measuring 1.4 cm.    In Hospital Course, Cardio - Dr. MIAH Delong, was consulted. Pt had an initial increase in troponin from 0.028 to 0.052, but then it trended down from 0.052 to 0.043. Pt was also seen by Gastroenterologist, Dr. Martins, for pancreatitis. Lipase elevated around 500s, but no evidence of pancreatitis on MRCP or CT Abd/Pelvis.     5/16/17 US gallbladder -  Unremarkable gallbladder ultrasound. Pt also had MRI abdomen showing: No common bile duct calculus demonstrated, No MR evidence for pancreatitis, Innumerable subcentimeter probably benign splenic lesions of uncertain etiology.    5/17/17 Pt outpatient PMD notified Hospitalist team that pt had elevated Thyroid function results on outpatient labs, which may be a probable source of tachycardia for patient. Endocrinologist, Dr. Perlman, was consulted. Pt was started on methimazole 20mg daily and metoprolol 25mg daily for hyperthyroidism. TSH here was < 0.01. Free T4 was 4.3. and Total T3 was 279. Patient will follow up outpatient for a radioactive iodine scan of thyroid. US of thyroid and parathyroid was unremarkable.    US Carotids showed Left ICA stenosis of 50-70%. Patient was treated with medical management with lipitor 10mg daily.  TTE was done and official report said *******    Patient stable for discharge to home without any needs.  On day of discharge, VS: T97.2, HR90, /70, RR17, O296% on RA  Labs sig for lipase of 693 and abnormal thyroid studies as mentioned above. Patient shows no clinical signs of pancreatitis, as she has no abdominal pain and is tolerating a regular diet.   Physical Exam:  Gen: WDWN in NAD  CV: RRR, no murmurs  Resp: CTAB, no wheezing  Abd: Soft, NT, ND, +BSx4  Neuro: A+Ox3 per family members, pt responsive to commands  Ext: No edema, no cyanosis 82 y/o female with PMHx HTN, HLD, early menopause at age 31, b/l cataracts presented with h/o one week prior to admission generalized abdominal pain and left chest pain. Admited to dyspnea on exertion while walking up and down the stairs. Chest pain was non-radiating and 6/10 in intensity. Had chronic left shoulder pain. Patient's son (Adarsh) was at bedside translating and also stated that patient had decreased appetite which improved the day prior to admission but returned on day of admission. Patient stated that abdominal discomfort improved with burping. Had one episode of NBNB vomiting 4 days prior to admission. Admited to a sensation of a pulsatile thumping in her left ear. Denied palpitations, nausea, diarrhea, vaginal bleeding, BRBPR, melena.    In the ED, patient's labs significant for elevated lipase of 593, first set CE's neg. EKG showed sinus tachycardia. CXR showed no focal consolidation or pleural effusion. CT Angio Chest was done to r/o PE: showed no central PE. CT abd/pelvis showed thickened endometrium, measuring 1.4 cm.    In Hospital Course, Cardio - Dr. MIAH Delong, was consulted. Pt had an initial increase in troponin from 0.028 to 0.052, but then it trended down from 0.052 to 0.043. Pt was also seen by Gastroenterologist, Dr. Martins, for pancreatitis. Lipase elevated around 500s, but no evidence of pancreatitis on MRCP or CT Abd/Pelvis.     5/16/17 US gallbladder -  Unremarkable gallbladder ultrasound. Pt also had MRI abdomen showing: No common bile duct calculus demonstrated, No MR evidence for pancreatitis, Innumerable subcentimeter probably benign splenic lesions of uncertain etiology.    5/17/17 Pt outpatient PMD notified Hospitalist team that pt had elevated Thyroid function results on outpatient labs, which may be a probable source of tachycardia for patient. Endocrinologist, Dr. Perlman, was consulted. Pt was started on methimazole 20mg daily and metoprolol 25mg daily for hyperthyroidism. TSH here was < 0.01. Free T4 was 4.3. and Total T3 was 279. Patient will follow up outpatient for a radioactive iodine scan of thyroid. US of thyroid and parathyroid was unremarkable.    US Carotids showed Left ICA stenosis of 50-70%. Patient was treated with medical management with lipitor 10mg daily.  TTE was done and official report said *******    Patient stable for discharge to home without any needs. ID  [id number]  On day of discharge, VS: T97.2, HR90, /70, RR17, O296% on RA  Labs sig for lipase of 693 and abnormal thyroid studies as mentioned above. Patient shows no clinical signs of pancreatitis, as she has no abdominal pain and is tolerating a regular diet.   Physical Exam:  Gen: WDWN in NAD  CV: RRR, no murmurs  Resp: CTAB, no wheezing  Abd: Soft, NT, mild epigastric pain on palpation, +BSx4  Neuro: A+Ox3 per family members, pt responsive to commands  Ext: No edema, no cyanosis 82 y/o female with PMHx HTN, HLD, early menopause at age 31, b/l cataracts presented with h/o one week prior to admission generalized abdominal pain and left chest pain. Admited to dyspnea on exertion while walking up and down the stairs. Chest pain was non-radiating and 6/10 in intensity. Had chronic left shoulder pain. Patient's son (Adarsh) was at bedside translating and also stated that patient had decreased appetite which improved the day prior to admission but returned on day of admission. Patient stated that abdominal discomfort improved with burping. Had one episode of NBNB vomiting 4 days prior to admission. Admited to a sensation of a pulsatile thumping in her left ear. Denied palpitations, nausea, diarrhea, vaginal bleeding, BRBPR, melena.    In the ED, patient's labs significant for elevated lipase of 593, first set CE's neg. EKG showed sinus tachycardia. CXR showed no focal consolidation or pleural effusion. CT Angio Chest was done to r/o PE: showed no central PE. CT abd/pelvis showed thickened endometrium, measuring 1.4 cm.    In Hospital Course, Cardio - Dr. MIAH Delong, was consulted. Pt had an initial increase in troponin from 0.028 to 0.052, but then it trended down from 0.052 to 0.043. Pt was also seen by Gastroenterologist, Dr. Martnis, for pancreatitis. Lipase elevated around 500s, but no evidence of pancreatitis on MRCP or CT Abd/Pelvis.     5/16/17 US gallbladder -  Unremarkable gallbladder ultrasound. Pt also had MRI abdomen showing: No common bile duct calculus demonstrated, No MR evidence for pancreatitis, Innumerable subcentimeter probably benign splenic lesions of uncertain etiology.    5/17/17 Pt outpatient PMD notified Hospitalist team that pt had elevated Thyroid function results on outpatient labs, which may be a probable source of tachycardia for patient. Endocrinologist, Dr. Perlman, was consulted. Pt was started on methimazole 20mg daily and metoprolol 25mg daily for hyperthyroidism. TSH here was < 0.01. Free T4 was 4.3. and Total T3 was 279. Patient will follow up outpatient for a radioactive iodine scan of thyroid. US of thyroid and parathyroid was unremarkable.    US Carotids showed Left ICA stenosis of 50-70%. Patient was treated with medical management with lipitor 10mg daily.  TTE was done and official report said *******    Patient had regular bowel movement prior to discharge. Patient stable for discharge to home without any needs. ID  [id number]  On day of discharge, VS: T97.2, HR90, /70, RR17, O296% on RA  Labs sig for lipase of 693 and abnormal thyroid studies as mentioned above. Patient shows no clinical signs of pancreatitis, as she has no abdominal pain and is tolerating a regular diet.   Physical Exam:  Gen: WDWN in NAD  CV: RRR, no murmurs  Resp: CTAB, no wheezing  Abd: Soft, NT, mild epigastric pain on palpation, +BSx4  Neuro: A+Ox3 per family members, pt responsive to commands  Ext: No edema, no cyanosis HPI:  80 y/o female with PMHx HTN, HLD, early menopause at age 31, b/l cataracts presented with h/o one week prior to admission generalized abdominal pain and left chest pain. Admited to dyspnea on exertion while walking up and down the stairs. Chest pain was non-radiating and 6/10 in intensity. Had chronic left shoulder pain. Patient's son (Adarsh) was at bedside translating and also stated that patient had decreased appetite which improved the day prior to admission but returned on day of admission. Patient stated that abdominal discomfort improved with burping. Had one episode of NBNB vomiting 4 days prior to admission. Admited to a sensation of a pulsatile thumping in her left ear. Denied palpitations, nausea, diarrhea, vaginal bleeding, BRBPR, melena.    Hospital Course:  In the ED, patient's labs significant for elevated lipase of 593, first set CE's neg. EKG showed sinus tachycardia. CXR showed no focal consolidation or pleural effusion. In the ER, CT Angio Chest was done to r/o PE: showed no central PE. CT abd/pelvis had incidental finding of thickened endometrium, measuring 1.4 cm, which pt will f/up as outpatient.     Cardio - Dr. MIAH Delong, was consulted in the ER. Pt had negative cardiac enzymes. Pt was also seen by Gastroenterologist, Dr. Martins, for likely resolving pancreatitis. Lipase elevated around 500-600s, but no evidence of pancreatitis on MRCP or CT Abd/Pelvis. RUQ US showed normal gallbladder without any gallstones or biliary sludge noted. Triglycerides normal. Pt does not drink EtOH. Unclear etiology if pt did have pancreatitis. IGG-4 pending. Perhaps has non-bleeding PUD. Started on famotidine and will f/up with gastroenterology in the office to f/up on elevated lipase and also for potential PUD w/up. Diet advanced and pt tolerate regular diet well without abdominal pain.     MRCP results: No common bile duct calculus demonstrated, No MR evidence for pancreatitis, Innumerable subcentimeter probably benign splenic lesions of uncertain etiology.    5/17/17 Pt's outpatient PMD notified Hospitalist team that pt had elevated Thyroid function results on outpatient labs which were drawn just prior to admission, which at that point after rehydration was likely etiology of persistent sinus tachycardia. Endocrinologist, Dr. Perlman, was consulted. Pt was started on methimazole 20mg daily and metoprolol 25mg daily for hyperthyroidism. TSH here was < 0.01. Free T4 was 4.3. and Total T3 was 279. Patient will follow up outpatient for a radioactive iodine scan of thyroid. US of thyroid and parathyroid was unremarkable.    US Carotids showed Left ICA stenosis of 50-70%. Patient was started on medical management with lipitor and aspirin.  TTE was done and showed: Overall normal left ventricular function. Moderate to severe pulmonary hypertension. Normal right ventricular size and function.    Patient had regular bowel movement prior to discharge. Patient stable for discharge to home without any needs. ID  [id number]    On day of discharge, ROS: denies abd pain, nausea, fever, chills, CP, palpitations, SOB.   Physical Exam:  Gen: NAD  CV: RRR, S1, S2, no murmurs  Resp: CTAB, no wheezing  Abd: Soft, non-distended, non-tender, +BSx4  Neuro: A+Ox3 per family members, pt responsive to commands/questions  Ext: No edema, no cyanosis    Time spent on discharge: 45 min    Spoke to PMD, Dr. Malhotra, regarding admission and hospital course.

## 2017-05-16 NOTE — DISCHARGE NOTE ADULT - CARE PLAN
Principal Discharge DX:	Chest pain, unspecified type  Goal:	Resolution  Secondary Diagnosis:	Tachycardia  Goal:	Resolution  Instructions for follow-up, activity and diet:	Continue with metoprolol. Follow up with your Primary care doctor.  Secondary Diagnosis:	Hypertension  Goal:	Control Blood Pressure levels  Instructions for follow-up, activity and diet:	Continue with metoprolol and hydrochlorothiazide. Follow up with your Primary care doctor.  Secondary Diagnosis:	Hyperlipidemia  Goal:	Control Cholesterol levels  Instructions for follow-up, activity and diet:	Low fat, DASH diet. Follow up with your Primary care doctor.  Secondary Diagnosis:	Cataracts, bilateral  Goal:	Prevent symptoms Principal Discharge DX:	Pancreatitis  Goal:	Resolution  Secondary Diagnosis:	Tachycardia  Goal:	Resolution  Instructions for follow-up, activity and diet:	Continue with metoprolol. Follow up with your Primary care doctor.  Secondary Diagnosis:	Hypertension  Goal:	Control Blood Pressure levels  Instructions for follow-up, activity and diet:	Continue with metoprolol and hydrochlorothiazide. Follow up with your Primary care doctor.  Secondary Diagnosis:	Hyperlipidemia  Goal:	Control Cholesterol levels  Instructions for follow-up, activity and diet:	Low fat, DASH diet. Follow up with your Primary care doctor.  Secondary Diagnosis:	Cataracts, bilateral  Goal:	Prevent symptoms Principal Discharge DX:	Pancreatitis  Goal:	Resolution  Instructions for follow-up, activity and diet:	Encourage low fat diet, avoid alcoholic beverages. Follow up with Gastroenterologist, Dr. Martins.  Secondary Diagnosis:	Tachycardia  Goal:	Resolution  Instructions for follow-up, activity and diet:	Continue with metoprolol. Follow up with your Primary care doctor.  Secondary Diagnosis:	Hypertension  Goal:	Control Blood Pressure levels  Instructions for follow-up, activity and diet:	Continue with metoprolol and hydrochlorothiazide. Follow up with your Primary care doctor.  Secondary Diagnosis:	Hyperlipidemia  Goal:	Control Cholesterol levels  Instructions for follow-up, activity and diet:	Low fat, DASH diet. Follow up with your Primary care doctor.  Secondary Diagnosis:	Chest pain, unspecified type  Goal:	Resolved  Instructions for follow-up, activity and diet:	Unlikely cardiac source, cardiac enzymes trended down. Follow up with your Primary Care Doctor.  Secondary Diagnosis:	Hyperthyroidism  Goal:	Prevent symptoms  Instructions for follow-up, activity and diet:	Possible source of tachycardia. Principal Discharge DX:	Pancreatitis  Goal:	Resolution  Instructions for follow-up, activity and diet:	Encourage low fat diet, avoid alcoholic beverages. Follow up with Gastroenterologist, Dr. Martins.  Secondary Diagnosis:	Tachycardia  Goal:	Resolution  Instructions for follow-up, activity and diet:	Continue with metoprolol. Follow up with your Primary care doctor.  Secondary Diagnosis:	Hypertension  Goal:	Control Blood Pressure levels  Instructions for follow-up, activity and diet:	Continue with lisinopril. Follow up with your Primary care doctor.  Secondary Diagnosis:	Hyperlipidemia  Goal:	Control Cholesterol levels  Instructions for follow-up, activity and diet:	Low fat, DASH diet. Follow up with your Primary care doctor.  Secondary Diagnosis:	Chest pain, unspecified type  Goal:	Resolved  Instructions for follow-up, activity and diet:	Unlikely cardiac source, cardiac enzymes trended down. Follow up with your Primary Care Doctor.  Secondary Diagnosis:	Hyperthyroidism  Goal:	Prevent symptoms  Instructions for follow-up, activity and diet:	Possible source of tachycardia. Principal Discharge DX:	Pancreatitis  Goal:	Resolution  Instructions for follow-up, activity and diet:	Encourage low fat diet, avoid alcoholic beverages. Follow up with Gastroenterologist, Dr. Martins.  Secondary Diagnosis:	Tachycardia  Goal:	Resolution  Instructions for follow-up, activity and diet:	likely 2/2 to hyperthyoidism  Continue with metoprolol 25mg daily. Follow up with your Primary care doctor.  Secondary Diagnosis:	Hypertension  Goal:	Control Blood Pressure levels  Instructions for follow-up, activity and diet:	Continue with lisinopril. Follow up with your Primary care doctor.  Secondary Diagnosis:	Hyperlipidemia  Goal:	Control Cholesterol levels  Instructions for follow-up, activity and diet:	Low fat, DASH diet. Follow up with your Primary care doctor.  start lipitor 10mg daily at night  Secondary Diagnosis:	Chest pain, unspecified type  Goal:	Resolved  Instructions for follow-up, activity and diet:	Unlikely cardiac source, cardiac enzymes trended down. Follow up with your Primary Care Doctor.  Secondary Diagnosis:	Hyperthyroidism  Goal:	Prevent symptoms  Instructions for follow-up, activity and diet:	likely source tachycardia.  cont methimazole 20mg daily  cont metoprolol 25mg daily  recheck thyroid tests 2-3 weeks after discharge  f/u at [facility] for radioactive scan as outpatient Principal Discharge DX:	Pancreatitis  Goal:	Resolution  Instructions for follow-up, activity and diet:	Encourage low fat diet, avoid alcoholic beverages. Follow up with Gastroenterologist, Dr. Martins.  Secondary Diagnosis:	Tachycardia  Goal:	Resolution  Instructions for follow-up, activity and diet:	likely 2/2 to hyperthyoidism  Continue with metoprolol 25mg daily. Follow up with your Primary care doctor.  Secondary Diagnosis:	Hypertension  Goal:	Control Blood Pressure levels  Instructions for follow-up, activity and diet:	Continue with lisinopril. Follow up with your Primary care doctor.  Secondary Diagnosis:	Hyperlipidemia  Goal:	Control Cholesterol levels  Instructions for follow-up, activity and diet:	Low fat, DASH diet. Follow up with your Primary care doctor.  start lipitor 10mg daily at night  Secondary Diagnosis:	Chest pain, unspecified type  Goal:	Resolved  Instructions for follow-up, activity and diet:	Unlikely cardiac source, cardiac enzymes trended down. Follow up with your Primary Care Doctor.  Secondary Diagnosis:	Hyperthyroidism  Goal:	Prevent symptoms  Instructions for follow-up, activity and diet:	likely source tachycardia.  cont methimazole 20mg daily  cont metoprolol 25mg daily  recheck thyroid tests 2-3 weeks after discharge  f/u at [place] [facility] for radioactive scan as outpatient Principal Discharge DX:	Pancreatitis  Goal:	Resolution  Instructions for follow-up, activity and diet:	Encourage low fat diet, avoid alcoholic beverages. Follow up with Gastroenterologist, Dr. Martins.  Secondary Diagnosis:	Tachycardia  Goal:	Resolution  Instructions for follow-up, activity and diet:	likely 2/2 to hyperthyoidism  Continue with metoprolol 25mg daily. Follow up with your Primary care doctor.  Secondary Diagnosis:	Hypertension  Goal:	Control Blood Pressure levels  Instructions for follow-up, activity and diet:	Continue with lisinopril. Follow up with your Primary care doctor.  Secondary Diagnosis:	Hyperlipidemia  Goal:	Control Cholesterol levels  Instructions for follow-up, activity and diet:	Low fat, DASH diet. Follow up with your Primary care doctor.  start lipitor 10mg daily at night  Secondary Diagnosis:	Chest pain, unspecified type  Goal:	Resolved  Instructions for follow-up, activity and diet:	Unlikely cardiac source, cardiac enzymes trended down. Follow up with your Primary Care Doctor.  Secondary Diagnosis:	Hyperthyroidism  Goal:	Prevent symptoms  Instructions for follow-up, activity and diet:	likely source tachycardia.  cont methimazole 20mg daily  cont metoprolol 25mg daily  recheck thyroid tests 2-3 weeks after discharge  f/u at [place] [facility] for radioactive scan as outpatient Principal Discharge DX:	Pancreatitis  Goal:	Resolution  Instructions for follow-up, activity and diet:	Encourage low fat diet, avoid alcoholic beverages. Follow up with Gastroenterologist, Dr. Martins (phone number below) within 1-2 weeks for your elevated lipase level and possibly to evaluate your stomach. Continue famotidine until that visit and readdress with him if need to continue.  Secondary Diagnosis:	Hypertension  Instructions for follow-up, activity and diet:	Continue with lisinopril. Follow up with your Primary care doctor.  Secondary Diagnosis:	Hyperlipidemia  Instructions for follow-up, activity and diet:	Low fat, low cholesterol diet. Follow up with your Primary care doctor.  start lipitor 10mg daily at night  Secondary Diagnosis:	Chest pain, unspecified type  Instructions for follow-up, activity and diet:	Unlikely cardiac source, cardiac enzymes negative. Follow up with your Primary Care Doctor.  Secondary Diagnosis:	Hyperthyroidism  Instructions for follow-up, activity and diet:	likely source of tachycardia  cont methimazole 20mg daily  cont metoprolol 25mg daily  recheck thyroid tests 2-3 weeks after discharge  follow up at [place] [facility] for your hyperthyroidism. Call [phone] for an appointment. You will need a radioactive iodine scan as outpatient and also to recheck your thyroid function blood tests in 2-3 weeks. Take the new medications metoprolol 25mg daily and methimazole 20mg daily starting tomorrow 05/18/17.  Secondary Diagnosis:	Carotid artery stenosis  Instructions for follow-up, activity and diet:	You have some tightening in your left internal carotid artery (likely ~50%). Start taking atorvastatin and aspirin as prescribed. Follow up with your primary care doctor within a week.  Secondary Diagnosis:	Endometrial thickening on ultra sound  Instructions for follow-up, activity and diet:	You were found to have thickening of the lining of your uterus on a CT scan. Follow up with your PMD to set you up with a gynecologist for a possible transvaginal ultrasound and/or for an endometrial biopsy in the office. Principal Discharge DX:	Pancreatitis  Goal:	Resolution  Instructions for follow-up, activity and diet:	Encourage low fat diet, avoid alcoholic beverages. Follow up with Gastroenterologist, Dr. Martins (phone number below) within 1-2 weeks for your elevated lipase level and possibly to evaluate your stomach. Continue famotidine until that visit and readdress with him if need to continue.  Secondary Diagnosis:	Hypertension  Instructions for follow-up, activity and diet:	Continue with lisinopril. Follow up with your Primary care doctor.  Secondary Diagnosis:	Hyperlipidemia  Instructions for follow-up, activity and diet:	Low fat, low cholesterol diet. Follow up with your Primary care doctor.  start lipitor 10mg daily at night  Secondary Diagnosis:	Chest pain, unspecified type  Instructions for follow-up, activity and diet:	Unlikely cardiac source, cardiac enzymes negative. Follow up with your Primary Care Doctor.  Secondary Diagnosis:	Hyperthyroidism  Instructions for follow-up, activity and diet:	likely source of tachycardia  cont methimazole 20mg daily  cont metoprolol 25mg daily  recheck thyroid tests 2-3 weeks after discharge  follow up at [place] [facility] for your hyperthyroidism. Call [phone] for an appointment. You will need a radioactive iodine scan as outpatient and also to recheck your thyroid function blood tests in 2-3 weeks. Take the new medications metoprolol 25mg daily and methimazole 20mg daily starting tomorrow 05/18/17.  Secondary Diagnosis:	Carotid artery stenosis  Instructions for follow-up, activity and diet:	You have some tightening in your left internal carotid artery (likely ~50%). Start taking atorvastatin and aspirin as prescribed. Follow up with your primary care doctor within a week.  Secondary Diagnosis:	Endometrial thickening on ultra sound  Instructions for follow-up, activity and diet:	You were found to have thickening of the lining of your uterus on a CT scan. Follow up with your PMD to set you up with a gynecologist for a possible transvaginal ultrasound and/or for an endometrial biopsy in the office. Principal Discharge DX:	Pancreatitis  Goal:	Resolution  Instructions for follow-up, activity and diet:	Encourage low fat diet, avoid alcoholic beverages. Follow up with Gastroenterologist, Dr. Martins (phone number below) within 1-2 weeks for your elevated lipase level and possibly to evaluate your stomach. Continue famotidine until that visit and readdress with him if need to continue.  Secondary Diagnosis:	Hypertension  Instructions for follow-up, activity and diet:	Continue with lisinopril. Follow up with your Primary care doctor.  Secondary Diagnosis:	Hyperlipidemia  Instructions for follow-up, activity and diet:	Low fat, low cholesterol diet. Follow up with your Primary care doctor.  start lipitor 10mg daily at night  Secondary Diagnosis:	Chest pain, unspecified type  Instructions for follow-up, activity and diet:	Unlikely cardiac source, cardiac enzymes negative. Follow up with your Primary Care Doctor.  Secondary Diagnosis:	Hyperthyroidism  Instructions for follow-up, activity and diet:	likely source of tachycardia  cont methimazole 20mg daily  cont metoprolol 25mg daily  recheck thyroid tests 2-3 weeks after discharge  follow up at [place] [facility] for your hyperthyroidism. Call [phone] for an appointment. You will need a radioactive iodine scan as outpatient and also to recheck your thyroid function blood tests in 2-3 weeks. Take the new medications metoprolol 25mg daily and methimazole 20mg daily starting tomorrow 05/18/17.  Secondary Diagnosis:	Carotid artery stenosis  Instructions for follow-up, activity and diet:	You have some tightening in your left internal carotid artery (likely ~50%). Start taking atorvastatin and aspirin as prescribed. Follow up with your primary care doctor within a week.  Secondary Diagnosis:	Endometrial thickening on ultra sound  Instructions for follow-up, activity and diet:	You were found to have thickening of the lining of your uterus on a CT scan. Follow up with your PMD to set you up with a gynecologist for a possible transvaginal ultrasound and/or for an endometrial biopsy in the office.

## 2017-05-16 NOTE — PROGRESS NOTE ADULT - PROBLEM SELECTOR PLAN 3
-likely 2/2 pain, will cont to monitor on tele  -f/u TTE results  -f/u cardio recs  -CT angio neg for PE -likely 2/2 pain vs dehydration, will cont to monitor on tele  -f/u TTE results  -f/u cardio recs  -CT angio neg for PE -likely 2/2 dehydration, will cont to monitor on tele  -f/u TTE results  -f/u cardio recs  -left carotid bruit auscultated-will order b/l carotid dopplers-f/u results  -CT angio neg for PE -sinus tach likely 2/2 dehydration, will cont to monitor on tele  -rehydrate  -f/u TTE results  -f/u cardio recs  -left carotid bruit auscultated-will order b/l carotid dopplers-f/u results  -CT angio neg for PE

## 2017-05-16 NOTE — DISCHARGE NOTE ADULT - MEDICATION SUMMARY - MEDICATIONS TO TAKE
I will START or STAY ON the medications listed below when I get home from the hospital:    aspirin 81 mg oral delayed release tablet  -- 1 tab(s) by mouth once a day  -- Indication: For Carotid artery stenosis    lisinopril 5 mg oral tablet  -- 1 tab(s) by mouth once a day  -- Indication: For Hypertension    atorvastatin 10 mg oral tablet  -- 1 tab(s) by mouth once a day (at bedtime)  -- Indication: For Carotid artery stenosis    methIMAzole 10 mg oral tablet  -- 2 tab(s) by mouth once a day  -- Indication: For Hyperthyroidism    metoprolol succinate 25 mg oral tablet, extended release  -- 1 tab(s) by mouth once a day  -- Indication: For Hyperthyroidism    famotidine 20 mg oral tablet  -- 1 tab(s) by mouth 2 times a day  -- It is very important that you take or use this exactly as directed.  Do not skip doses or discontinue unless directed by your doctor.  Obtain medical advice before taking any non-prescription drugs as some may affect the action of this medication.    -- Indication: For stomach acid suppression    Vitamin B-12 1000 mcg oral tablet  -- 1 tab(s) by mouth once a day  -- Indication: For vitamin b12 supplement    Vitamin D2 50,000 intl units (1.25 mg) oral capsule  -- 1 cap(s) by mouth once a week  -- Indication: For vitamin D deficiency

## 2017-05-16 NOTE — CONSULT NOTE ADULT - ASSESSMENT
82 y/o female with PMHx HTN, HLD, early menopause at age 31, b/l cataracts presents with a one week history of generalized abdominal pain and left chest pain.   - The patient's chest pain is unlikely related to an acute coronary syndrome. The cardiac enzyme profile supports this.  EKG is without ischemic changes.   - Check echo.   - Tachycardia is likely a result of significant volume depletion Hold diuretics.   - Check carotid doppler.   - Rx for pancreatitis.   - Monitor and replete electrolytes. Keep K>4.0 and Mg>2.0.  - Further cardiac workup will depend on clinical course.   - All other workup per primary team. Will followup.

## 2017-05-16 NOTE — DISCHARGE NOTE ADULT - SECONDARY DIAGNOSIS.
Tachycardia Hypertension Hyperlipidemia Cataracts, bilateral Hyperthyroidism Chest pain, unspecified type Endometrial thickening on ultra sound Carotid artery stenosis

## 2017-05-16 NOTE — PROGRESS NOTE ADULT - ASSESSMENT
80 y/o female with PMHx HTN, HLD, early menopause at age 31, b/l cataracts admitted with generalized abdominal pain 2/2 acute pancreatitis and left chest pain, r/o ACS. 82 y/o female with PMHx HTN, HLD, early menopause at age 31, b/l cataracts admitted with generalized abdominal pain likely 2/2 resolving acute pancreatitis and sinus tach likely 2/2 dehydration.

## 2017-05-16 NOTE — DISCHARGE NOTE ADULT - PROVIDER TOKENS
FREE:[LAST:[Tall],FIRST:[Bharati],PHONE:[(   )    -],FAX:[(   )    -],ADDRESS:[[facility]- Dr. Bharati Malhotra]] FREE:[LAST:[Tall],FIRST:[Bharati],PHONE:[(   )    -],FAX:[(   )    -],ADDRESS:[[facility]- Dr. Bharati Malhotra]],TOKEN:'[id number]'

## 2017-05-16 NOTE — PROGRESS NOTE ADULT - PROBLEM SELECTOR PLAN 5
-hold HCTZ as pt is on IVF and appears dehydrated  -cont Lisinopril 5mg daily with hold parameters  -BP stable with SBP < 140s

## 2017-05-16 NOTE — PROGRESS NOTE ADULT - PROBLEM SELECTOR PLAN 2
-patient has history of early menopause and thickened endometrium on CT Abd/Pelvis  consider uterine pathology workup with dedicated imaging  -elevated lipase 546 suggestive of pancreatitis  -Diet: NPO, IVF LR at 150cc/hr for now, Tylenol PRN mild pain as patient is not in any distress/pain  -f/u CMP and lipase in AM  -lipid panel within normal limits  -RUQ US showed no evidence of biliary or gallbladder pathology -patient has history of early menopause and thickened endometrium on CT Abd/Pelvis, may consider further workup outpatient or if additional symptoms arise  -elevated lipase 546 suggestive of pancreatitis  -Diet: NPO, IVF LR at 150cc/hr for now, Tylenol PRN mild pain as patient is not in any distress/pain  -f/u CMP and lipase in AM  -lipid panel within normal limits  -RUQ US showed no evidence of biliary or gallbladder pathology -patient has history of early menopause and thickened endometrium on CT Abd/Pelvis, may consider further workup outpatient or if additional symptoms arise  -elevated lipase 546 suggestive of pancreatitis  -Diet: NPO, IVF LR at 150cc/hr for now, Tylenol PRN mild pain as patient is not in any distress/pain  -f/u CMP and lipase in AM  -lipid panel within normal limits  -RUQ US showed no evidence of biliary or gallbladder pathology  -GI consulted Dr. Syed, will eval pancreas and GB further with MRCP-f/u results -patient has history of early menopause and thickened endometrium on CT Abd/Pelvis,  further workup as outpatient  -elevated lipase 546, suspect pt may have had pancreatitis that is not resolving after a week of symptoms  -Diet: clear liquid diet, IVF LR at 150cc/hr for now, Tylenol PRN mild pain as patient is not in any distress/pain  -f/u CMP and lipase in AM  -lipid panel (triglycerides) within normal limits  -RUQ US showed no evidence of biliary or gallbladder pathology  -GI consulted Dr. Syed, will eval pancreas further with MRCP-f/u results

## 2017-05-16 NOTE — CONSULT NOTE ADULT - SUBJECTIVE AND OBJECTIVE BOX
CHIEF COMPLAINT: Patient is a 81y old  Female who presents with a chief complaint of abdominal pain/chest pain (16 May 2017 07:47)      HPI: Creole speaking with daughter helping with history.   80 y/o female with PMHx HTN, HLD, early menopause at age 31, b/l cataracts presents with a one week history of generalized abdominal pain and left chest pain. Per her family she has not been eating well for a few weeks. She has been complaining of her heart racing when doing activities for the last few days.  Admits to dyspnea on exertion while walking up and down the stairs. Chest pain was non-radiating and 6/10 in intensity. Has chronic left shoulder pain.  Patient states that abdominal discomfort improves with burping. Had one episode of NBNB vomiting 4 days prior to admission. Currently has no chest pain. Denies any  PND, orthopnea, near syncope, syncope, lower extremity edema, stroke like symptoms.     In the ED, patient's labs significant for elevated lipase of 593, first set CE's neg. EKG showed sinus tachycardia. CXR showed no focal consolidation or pleural effusion. CT Angio Chest was done to r/o PE: showed no central PE. CT abd/pelvis showed thickened endometrium, measuring 1.4 cm. Correlation with hysterosonography, direct visualization, or pelvic MRI recommended.    Vital Signs Last 24 Hrs  T(C): 37, Max: 37.1 (05-15 @ 17:55)  T(F): 98.6, Max: 98.8 (05-15 @ 17:55)  HR: 98 (98 - 110)  BP: 143/72 (136/65 - 143/72)  BP(mean): --  RR: 15 (15 - 18)  SpO2: 98% (98% - 98%)         PAST MEDICAL & SURGICAL HISTORY:  Cataracts, bilateral  Early menopause: at age 31  Hyperlipidemia  Hypertension  H/O cataract      SOCIAL HISTORY:  No tobacco, ethanol, or drug abuse.    FAMILY HISTORY:  No pertinent family history in first degree relatives    No family history of acute MI or sudden cardiac death.    MEDICATIONS  (STANDING):  lisinopril 5milliGRAM(s) Oral daily  cyanocobalamin 1000MICROGram(s) Oral daily  enoxaparin Injectable 40milliGRAM(s) SubCutaneous every 24 hours  aspirin enteric coated 81milliGRAM(s) Oral daily  lactated ringers. 1000milliLiter(s) IV Continuous <Continuous>  docusate sodium 100milliGRAM(s) Oral daily  polyethylene glycol 3350 17Gram(s) Oral daily    MEDICATIONS  (PRN):  acetaminophen   Tablet. 650milliGRAM(s) Oral every 6 hours PRN Mild Pain (1 - 3)      Allergies    No Known Allergies    Intolerances        REVIEW OF SYSTEMS:   All other review of systems are negative unless indicated above    VITAL SIGNS:   Vital Signs Last 24 Hrs  T(C): 37, Max: 37.1 (05-15 @ 17:55)  T(F): 98.6, Max: 98.8 (05-15 @ 17:55)  HR: 101 (98 - 110)  BP: 130/70 (113/63 - 143/72)  BP(mean): --  RR: 17 (15 - 18)  SpO2: 93% (93% - 98%)    I&O's Summary    I & Os for current day (as of 16 May 2017 13:06)  =============================================  IN: 525 ml / OUT: 0 ml / NET: 525 ml      On Exam:  TELE: ST   Constitutional: NAD, awake and alert, well-developed  HEENT: Dry Mucous Membranes, Anicteric  Pulmonary: Non-labored, breath sounds are clear bilaterally, No wheezing, rales or rhonchi  Cardiovascular: Regular, S1 and S2, 1/6SM, ?left carotid bruit  Gastrointestinal: Bowel Sounds present, soft, tender epigastric pain on palpitation.   Lymph: No peripheral edema. No lymphadenopathy.  Skin: No visible rashes or ulcers.  Psych:  Mood & affect appropriate    LABS: All Labs Reviewed:                        11.8   6.6   )-----------( 160      ( 16 May 2017 05:58 )             36.1                         12.9   5.9   )-----------( 177      ( 15 May 2017 18:53 )             39.7     16 May 2017 05:58    142    |  104    |  26     ----------------------------<  93     3.9     |  28     |  0.74   15 May 2017 18:53    138    |  102    |  29     ----------------------------<  105    3.8     |  25     |  0.74     Ca    9.4        16 May 2017 05:58  Ca    9.8        15 May 2017 18:53    TPro  7.6    /  Alb  3.7    /  TBili  0.6    /  DBili  x      /  AST  34     /  ALT  39     /  AlkPhos  48     15 May 2017 18:53    PT/INR - ( 15 May 2017 18:53 )   PT: 11.9 sec;   INR: 1.09 ratio           CARDIAC MARKERS ( 16 May 2017 06:59 )  .043 ng/mL / x     / 81 U/L / x     / 0.9 ng/mL  CARDIAC MARKERS ( 16 May 2017 01:03 )  .052 ng/mL / x     / 101 U/L / x     / 1.2 ng/mL  CARDIAC MARKERS ( 15 May 2017 18:53 )  .028 ng/mL / x     / 129 U/L / x     / x          Blood Culture:         EKG:  SR with nonspecific ST changes

## 2017-05-17 DIAGNOSIS — K85.80 OTHER ACUTE PANCREATITIS WITHOUT NECROSIS OR INFECTION: ICD-10-CM

## 2017-05-17 DIAGNOSIS — K59.09 OTHER CONSTIPATION: ICD-10-CM

## 2017-05-17 LAB
ALBUMIN SERPL ELPH-MCNC: 2.7 G/DL — LOW (ref 3.3–5)
ALP SERPL-CCNC: 40 U/L — SIGNIFICANT CHANGE UP (ref 40–120)
ALT FLD-CCNC: 34 U/L — SIGNIFICANT CHANGE UP (ref 12–78)
ANION GAP SERPL CALC-SCNC: 9 MMOL/L — SIGNIFICANT CHANGE UP (ref 5–17)
AST SERPL-CCNC: 28 U/L — SIGNIFICANT CHANGE UP (ref 15–37)
BASOPHILS # BLD AUTO: 0 K/UL — SIGNIFICANT CHANGE UP (ref 0–0.2)
BASOPHILS NFR BLD AUTO: 0.5 % — SIGNIFICANT CHANGE UP (ref 0–2)
BILIRUB SERPL-MCNC: 0.8 MG/DL — SIGNIFICANT CHANGE UP (ref 0.2–1.2)
BUN SERPL-MCNC: 16 MG/DL — SIGNIFICANT CHANGE UP (ref 7–23)
CALCIUM SERPL-MCNC: 9.2 MG/DL — SIGNIFICANT CHANGE UP (ref 8.5–10.1)
CHLORIDE SERPL-SCNC: 105 MMOL/L — SIGNIFICANT CHANGE UP (ref 96–108)
CO2 SERPL-SCNC: 28 MMOL/L — SIGNIFICANT CHANGE UP (ref 22–31)
CREAT SERPL-MCNC: 0.59 MG/DL — SIGNIFICANT CHANGE UP (ref 0.5–1.3)
EOSINOPHIL # BLD AUTO: 0 K/UL — SIGNIFICANT CHANGE UP (ref 0–0.5)
EOSINOPHIL NFR BLD AUTO: 0.6 % — SIGNIFICANT CHANGE UP (ref 0–6)
GLUCOSE SERPL-MCNC: 90 MG/DL — SIGNIFICANT CHANGE UP (ref 70–99)
HCT VFR BLD CALC: 33.5 % — LOW (ref 34.5–45)
HGB BLD-MCNC: 10.9 G/DL — LOW (ref 11.5–15.5)
LIDOCAIN IGE QN: 551 U/L — HIGH (ref 73–393)
LYMPHOCYTES # BLD AUTO: 1.7 K/UL — SIGNIFICANT CHANGE UP (ref 1–3.3)
LYMPHOCYTES # BLD AUTO: 22.9 % — SIGNIFICANT CHANGE UP (ref 13–44)
MAGNESIUM SERPL-MCNC: 1.6 MG/DL — SIGNIFICANT CHANGE UP (ref 1.6–2.6)
MCHC RBC-ENTMCNC: 29 PG — SIGNIFICANT CHANGE UP (ref 27–34)
MCHC RBC-ENTMCNC: 32.5 GM/DL — SIGNIFICANT CHANGE UP (ref 32–36)
MCV RBC AUTO: 89.4 FL — SIGNIFICANT CHANGE UP (ref 80–100)
MONOCYTES # BLD AUTO: 0.8 K/UL — SIGNIFICANT CHANGE UP (ref 0–0.9)
MONOCYTES NFR BLD AUTO: 10.4 % — HIGH (ref 1–9)
NEUTROPHILS # BLD AUTO: 4.9 K/UL — SIGNIFICANT CHANGE UP (ref 1.8–7.4)
NEUTROPHILS NFR BLD AUTO: 65.6 % — SIGNIFICANT CHANGE UP (ref 43–77)
PHOSPHATE SERPL-MCNC: 3.9 MG/DL — SIGNIFICANT CHANGE UP (ref 2.5–4.5)
PLATELET # BLD AUTO: 146 K/UL — LOW (ref 150–400)
POTASSIUM SERPL-MCNC: 3.7 MMOL/L — SIGNIFICANT CHANGE UP (ref 3.5–5.3)
POTASSIUM SERPL-SCNC: 3.7 MMOL/L — SIGNIFICANT CHANGE UP (ref 3.5–5.3)
PROT SERPL-MCNC: 6 G/DL — SIGNIFICANT CHANGE UP (ref 6–8.3)
RBC # BLD: 3.75 M/UL — LOW (ref 3.8–5.2)
RBC # FLD: 10.5 % — SIGNIFICANT CHANGE UP (ref 10.3–14.5)
SODIUM SERPL-SCNC: 142 MMOL/L — SIGNIFICANT CHANGE UP (ref 135–145)
TSH SERPL-MCNC: <0.01 UIU/ML — SIGNIFICANT CHANGE UP (ref 0.36–3.74)
WBC # BLD: 7.5 K/UL — SIGNIFICANT CHANGE UP (ref 3.8–10.5)
WBC # FLD AUTO: 7.5 K/UL — SIGNIFICANT CHANGE UP (ref 3.8–10.5)

## 2017-05-17 PROCEDURE — 93306 TTE W/DOPPLER COMPLETE: CPT | Mod: 26

## 2017-05-17 PROCEDURE — 93880 EXTRACRANIAL BILAT STUDY: CPT | Mod: 26,59

## 2017-05-17 PROCEDURE — 99233 SBSQ HOSP IP/OBS HIGH 50: CPT | Mod: GC

## 2017-05-17 PROCEDURE — 76536 US EXAM OF HEAD AND NECK: CPT | Mod: 26

## 2017-05-17 PROCEDURE — 99233 SBSQ HOSP IP/OBS HIGH 50: CPT

## 2017-05-17 RX ORDER — GLYCERIN ADULT
1 SUPPOSITORY, RECTAL RECTAL ONCE
Qty: 0 | Refills: 0 | Status: COMPLETED | OUTPATIENT
Start: 2017-05-17 | End: 2017-05-17

## 2017-05-17 RX ORDER — METOPROLOL TARTRATE 50 MG
25 TABLET ORAL DAILY
Qty: 0 | Refills: 0 | Status: DISCONTINUED | OUTPATIENT
Start: 2017-05-17 | End: 2017-05-18

## 2017-05-17 RX ORDER — FAMOTIDINE 10 MG/ML
20 INJECTION INTRAVENOUS
Qty: 0 | Refills: 0 | Status: DISCONTINUED | OUTPATIENT
Start: 2017-05-17 | End: 2017-05-17

## 2017-05-17 RX ORDER — SODIUM CHLORIDE 9 MG/ML
1000 INJECTION INTRAMUSCULAR; INTRAVENOUS; SUBCUTANEOUS ONCE
Qty: 0 | Refills: 0 | Status: DISCONTINUED | OUTPATIENT
Start: 2017-05-17 | End: 2017-05-17

## 2017-05-17 RX ADMIN — Medication 81 MILLIGRAM(S): at 11:38

## 2017-05-17 RX ADMIN — Medication 100 MILLIGRAM(S): at 11:38

## 2017-05-17 RX ADMIN — Medication 1 SUPPOSITORY(S): at 15:49

## 2017-05-17 RX ADMIN — POLYETHYLENE GLYCOL 3350 17 GRAM(S): 17 POWDER, FOR SOLUTION ORAL at 11:39

## 2017-05-17 RX ADMIN — ENOXAPARIN SODIUM 40 MILLIGRAM(S): 100 INJECTION SUBCUTANEOUS at 11:39

## 2017-05-17 RX ADMIN — SODIUM CHLORIDE 150 MILLILITER(S): 9 INJECTION, SOLUTION INTRAVENOUS at 05:29

## 2017-05-17 RX ADMIN — SODIUM CHLORIDE 150 MILLILITER(S): 9 INJECTION, SOLUTION INTRAVENOUS at 11:38

## 2017-05-17 RX ADMIN — LISINOPRIL 5 MILLIGRAM(S): 2.5 TABLET ORAL at 05:29

## 2017-05-17 RX ADMIN — PREGABALIN 1000 MICROGRAM(S): 225 CAPSULE ORAL at 11:38

## 2017-05-17 RX ADMIN — Medication 25 MILLIGRAM(S): at 17:33

## 2017-05-17 NOTE — PROGRESS NOTE ADULT - PROBLEM SELECTOR PLAN 2
-patient has history of early menopause and thickened endometrium on CT Abd/Pelvis,  further workup as outpatient  -lipase downtrending now at 551, suspect pt may have had pancreatitis that is not resolving after a week of symptoms  -Diet: advanced to regular per GI recs, IVF LR at 150cc/hr for now, Tylenol PRN mild pain as patient is not in any distress/pain  -f/u CMP and lipase in AM  -lipid panel (triglycerides) within normal limits  -RUQ US showed no evidence of biliary or gallbladder pathology  -MRCP negative for any pancreatic or gallbladder pathology  -will f/u IgG subsets  -per GI, added pepcid 20mg PO BID today -sinus tach likely 2/2 dehydration, will cont to monitor on tele  -c/w IVF (has received 3L already, if HR not improving, consider other etiologies)  -f/u TTE results  -f/u cardio recs  -left carotid bruit auscultated-will order b/l carotid dopplers-f/u results  -CT angio neg for PE

## 2017-05-17 NOTE — CONSULT NOTE ADULT - SUBJECTIVE AND OBJECTIVE BOX
Chief Complaint:  Patient is a 81y old  Female who presents with a chief complaint of abdominal pain/chest pain (16 May 2017 07:47)    Cataracts, bilateral  Early menopause  Hyperlipidemia  Hypertension  H/O cataract     HPI:  80 y/o female with PMHx HTN, HLD, early menopause at age 31, b/l cataracts presents with a one week history of generalized abdominal pain and left chest pain. Admits to dyspnea on exertion while walking up and down the stairs. Chest pain was non-radiating and 6/10 in intensity. Has chronic left shoulder pain. Patient son (Adarsh) at bedside translating and also states that patient has had decreased appetite which improved the day prior to admission but returned on day of admission. Patient states that abdominal discomfort improves with burping. Had one episode of NBNB vomiting 4 days prior to admission. Currently has no chest pain. Admits to a sensation of a pulsatile thumping in her left ear. Denies palpitations, nausea, diarrhea, vaginal bleeding, BRBPR, melena.    In the ED, patient's labs significant for elevated lipase of 593, first set CE's neg. EKG showed sinus tachycardia. CXR showed no focal consolidation or pleural effusion. CT Angio Chest was done to r/o PE: showed no central PE. CT abd/pelvis showed thickened endometrium, measuring 1.4 cm. Correlation with hysterosonography, direct visualization, or pelvic MRI recommended.    Vital Signs Last 24 Hrs  T(C): 37, Max: 37.1 (05-15 @ 17:55)  T(F): 98.6, Max: 98.8 (05-15 @ 17:55)  HR: 98 (98 - 110)  BP: 143/72 (136/65 - 143/72)  BP(mean): --  RR: 15 (15 - 18)  SpO2: 98% (98% - 98%)    Dr. Griggs (cardio) consulted recs to admit patient (15 May 2017 23:00)      No Known Allergies      lisinopril 5milliGRAM(s) Oral daily  cyanocobalamin 1000MICROGram(s) Oral daily  enoxaparin Injectable 40milliGRAM(s) SubCutaneous every 24 hours  aspirin enteric coated 81milliGRAM(s) Oral daily  acetaminophen   Tablet. 650milliGRAM(s) Oral every 6 hours PRN  lactated ringers. 1000milliLiter(s) IV Continuous <Continuous>  docusate sodium 100milliGRAM(s) Oral daily  polyethylene glycol 3350 17Gram(s) Oral daily        FAMILY HISTORY:  No pertinent family history in first degree relatives        Review of Systems:    General:  No wt loss, fevers, chills, night sweats,fatigue,   Eyes:  Good vision, no reported pain  ENT:  No sore throat, pain, runny nose, dysphagia  CV:  No pain, palpitatioins, hypo/hypertension  Resp:  No dyspnea, cough, tachypnea, wheezing  :  No pain, bleeding, incontinence, nocturia  Muscle:  No pain, weakness  Neuro:  No weakness, tingling, memory problems  Psych:  No fatigue, insomnia, mood problems, depression  Endocrine:  No polyuria, polydypsia, cold/heat intolerance  Heme:  No petechiae, ecchymosis, easy bruisability  Skin:  No rash, tattoos, scars, edema    Relevant Family History:       Relevant Social History:       Physical Exam:    Vital Signs:  Vital Signs Last 24 Hrs  T(C): 36.8, Max: 37.1 (05-16 @ 15:57)  T(F): 98.2, Max: 98.8 (05-16 @ 15:57)  HR: 109 (105 - 117)  BP: 139/65 (137/56 - 139/75)  BP(mean): --  RR: 17 (17 - 18)  SpO2: 94% (92% - 96%)  Daily     Daily     General:  Appears stated age, well-groomed, well-nourished, no distress  HEENT:  NC/AT,  conjunctivae clear and pink, no thyromegaly, nodules, adenopathy, no JVD  Chest:  Full & symmetric excursion, no increased effort, breath sounds clear  Cardiovascular:  Regular rhythm, S1, S2, no murmur/rub/S3/S4, no abdominal bruit, no edema  Abdomen:  Soft, non-tender, non-distended, normoactive bowel sounds,  no masses ,no hepatosplenomeagaly, no signs of chronic liver disease  Extremities:  no cyanosis,clubbing or edema  Skin:  No rash/erythema/ecchymoses/petechiae/wounds/abscess/warm/dry  Neuro/Psych:  Alert, oriented, no asterixis, no tremor, no encephalopathy    Laboratory:                            10.9   7.5   )-----------( 146      ( 17 May 2017 07:10 )             33.5     05-17    142  |  105  |  16  ----------------------------<  90  3.7   |  28  |  0.59    Ca    9.2      17 May 2017 07:10  Phos  3.9     05-17  Mg     1.6     05-17    TPro  6.0  /  Alb  2.7<L>  /  TBili  0.8  /  DBili  x   /  AST  28  /  ALT  34  /  AlkPhos  40  05-17    LIVER FUNCTIONS - ( 17 May 2017 07:10 )  Alb: 2.7 g/dL / Pro: 6.0 g/dL / ALK PHOS: 40 U/L / ALT: 34 U/L / AST: 28 U/L / GGT: x           PT/INR - ( 15 May 2017 18:53 )   PT: 11.9 sec;   INR: 1.09 ratio             Amylase Serum--      Lipase [id number]       Ammonia--    Imaging:

## 2017-05-17 NOTE — PROGRESS NOTE ADULT - PROBLEM SELECTOR PLAN 5
-hold HCTZ as pt is on IVF and appears dehydrated  -cont Lisinopril 5mg daily with hold parameters  -BP stable with SBP < 140s -admitted to tele to monitor  -resolved since admission  -pt still in sinus tachycardia likely 2/2 dehydration with exertion  -CE negative-trended  -f/u cardiac recs  -f/u TTE report

## 2017-05-17 NOTE — PROGRESS NOTE ADULT - PROBLEM SELECTOR PLAN 1
-admit to tele to monitor  -resolved since admission  -pt still in sinus tachycardia likely 2/2 dehydration with exertion  -CE negative-trended  -f/u cardiac recs  -f/u TTE report -lipase downtrending now at 551, suspect pt may have had pancreatitis that is now resolving after a week of symptoms  -Diet: advanced to regular per GI recs, IVF LR at 150cc/hr for now, Tylenol PRN mild pain as patient is not in any distress/pain  -f/u CMP and lipase in AM  -lipid panel (triglycerides) within normal limits  -RUQ US showed no evidence of biliary or gallbladder pathology  -MRCP negative for any pancreatic or gallbladder pathology  -will f/u IgG subsets  -per GI, added pepcid 20mg PO BID today

## 2017-05-17 NOTE — PROGRESS NOTE ADULT - ATTENDING COMMENTS
Pt seen + examined. Case discussed with intern. Agree with progress note above (edited) with following addendum:  80yo F w/ PMH HTN, HLD, early menopause at age 31, b/l cataracts admitted with generalized abdominal pain likely 2/2 resolving acute pancreatitis and sinus tach possibly 2/2 dehydration.  Pt is presenting on the tail of end of a week-long period of epigastric pain, nausea, poor appetite. At this time, no longer meets criteria for acute pancreatitis since lipase is not >3x upper limit of normal, but perhaps pt did have episode of acute pancreatitis that is now resolving. Consulted GI, who recommended MRCP since RUQ US no sign of gallstones, pt does not use EtOH, and triglycerides normal. Abd pain has resolved and pt tolerated regular diet well today. GI suggested discussion with surgery -- called Dr. Cueva and discussed the case over the phone (he did not physically see the pt). In such a situation, where there is no evidence of gallstones on US or MRCP, he would not perform prophylactic surgery.  Pt was in sinus tach initially believed 2/2 significant dehydration in setting of very little po intake for a week. LR at 150cc/hr for 24 hours had only some improvement in tachycardia. At this point, after 3L LR, pt still having sinus tachycardia on exertion. Spoke with pt's PMD who had just incidentally drawn thyroid function tests and found that pt was hyperthyroid. Called endo consult. Redrew more extensive thyroid studies as per endo recs and start pt on Toprol and methimazole as per endo recs. Monitor. If stable, consider d/c tomorrow.
Pt seen + examined. Case discussed with intern. Agree with progress note above (edited) with following addendum:  80yo F w/ PMH HTN, HLD, early menopause at age 31, b/l cataracts admitted with generalized abdominal pain likely 2/2 resolving acute pancreatitis and sinus tach likely 2/2 dehydration.  Pt is presenting on the tail of end of a week-long period of epigastric pain, nausea, poor appetite. At this time, no longer meets criteria for acute pancreatitis since lipase is not >3x upper limit of normal, but perhaps pt did have episode of acute pancreatitis that is now resolving. Consulted GI, who recommended MRCP since RUQ US no sign of gallstones, pt does not use EtOH, and triglycerides normal.   Pt was in sinus tach likely from significant dehydration in setting of very little po intake for a week. LR at 150cc/hr for today. Advance diet to clear liquids and see how pt tolerates since abd pain now resolving.

## 2017-05-17 NOTE — PROGRESS NOTE ADULT - SUBJECTIVE AND OBJECTIVE BOX
Follow up: tachycardia    HPI:  patient is feeling somewhat better today with decreased abdominal pain and no symptoms of chest pain, dyspnea, or palpitations.    Vital Signs Last 24 Hrs  T(C): 37, Max: 37.1 (05-15 @ 17:55)  T(F): 98.6, Max: 98.8 (05-15 @ 17:55)  HR: 98 (98 - 110)  BP: 143/72 (136/65 - 143/72)  BP(mean): --  RR: 15 (15 - 18)  SpO2: 98% (98% - 98%)        PAST MEDICAL & SURGICAL HISTORY:  Cataracts, bilateral  Early menopause: at age 31  Hyperlipidemia  Hypertension  H/O cataract      MEDICATIONS  (STANDING):  lisinopril 5milliGRAM(s) Oral daily  cyanocobalamin 1000MICROGram(s) Oral daily  enoxaparin Injectable 40milliGRAM(s) SubCutaneous every 24 hours  aspirin enteric coated 81milliGRAM(s) Oral daily  lactated ringers. 1000milliLiter(s) IV Continuous <Continuous>  docusate sodium 100milliGRAM(s) Oral daily  polyethylene glycol 3350 17Gram(s) Oral daily  famotidine    Tablet 20milliGRAM(s) Oral two times a day  glycerin Suppository - Adult 1Suppository(s) Rectal once  sodium chloride 0.9% Bolus 1000milliLiter(s) IV Bolus once    MEDICATIONS  (PRN):  acetaminophen   Tablet. 650milliGRAM(s) Oral every 6 hours PRN Mild Pain (1 - 3)  aluminum hydroxide/magnesium hydroxide/simethicone Suspension 30milliLiter(s) Oral every 6 hours PRN Dyspepsia    Vital Signs Last 24 Hrs  T(C): 36.3, Max: 37.1 (05-16 @ 15:57)  T(F): 97.3, Max: 98.8 (05-16 @ 15:57)  HR: 100 (100 - 117)  BP: 138/69 (133/80 - 139/75)  BP(mean): --  RR: 17 (17 - 19)  SpO2: 96% (92% - 96%)    I&O's Summary    I & Os for current day (as of 17 May 2017 15:30)  =============================================  IN: 3150 ml / OUT: 0 ml / NET: 3150 ml      PHYSICAL EXAM:    Constitutional: NAD, awake and alert, well-developed  Eyes:  EOMI,  Pupils round, no lesions  ENMT: no exudate or erythema  Pulmonary: Non-labored, breath sounds are clear bilaterally, No wheezing, rales or rhonchi  Cardiovascular: PMI not palpable RRR normal S1 and S2, no murmurs, rubs, gallops or clicks  Gastrointestinal: Bowel Sounds present, soft, nontender.   Lymph: No peripheral edema. No cervical lymphadenopathy.  Neurological: Alert, no focal deficits  Skin: No rashes. No changes of chronic venous stasis. No cyanosis.  Psych:  Mood & affect appropriate       CARDIAC MARKERS ( 16 May 2017 06:59 )  .043 ng/mL / x     / 81 U/L / x     / 0.9 ng/mL  CARDIAC MARKERS ( 16 May 2017 01:03 )  .052 ng/mL / x     / 101 U/L / x     / 1.2 ng/mL  CARDIAC MARKERS ( 15 May 2017 18:53 )  .028 ng/mL / x     / 129 U/L / x     / x                                10.9   7.5   )-----------( 146      ( 17 May 2017 07:10 )             33.5     CBC Full  -  ( 17 May 2017 07:10 )  WBC Count : 7.5 K/uL  Hemoglobin : 10.9 g/dL  Hematocrit : 33.5 %  Platelet Count - Automated : 146 K/uL  Mean Cell Volume : 89.4 fl  Mean Cell Hemoglobin : 29.0 pg  Mean Cell Hemoglobin Concentration : 32.5 gm/dL  Auto Neutrophil # : 4.9 K/uL  Auto Lymphocyte # : 1.7 K/uL  Auto Monocyte # : 0.8 K/uL  Auto Eosinophil # : 0.0 K/uL  Auto Basophil # : 0.0 K/uL  Auto Neutrophil % : 65.6 %  Auto Lymphocyte % : 22.9 %  Auto Monocyte % : 10.4 %  Auto Eosinophil % : 0.6 %  Auto Basophil % : 0.5 %    05-17    142  |  105  |  16  ----------------------------<  90  3.7   |  28  |  0.59    Ca    9.2      17 May 2017 07:10  Phos  3.9     05-17  Mg     1.6     05-17    TPro  6.0  /  Alb  2.7<L>  /  TBili  0.8  /  DBili  x   /  AST  28  /  ALT  34  /  AlkPhos  40  05-17      Ventricular Rate 99 BPM    Atrial Rate 99 BPM    P-R Interval 136 ms    QRS Duration 82 ms    Q-T Interval 350 ms    QTC Calculation(Bezet) 449 ms    P Axis 58 degrees    R Axis 12 degrees    T Axis 26 degrees    Diagnosis Line Normal sinus rhythm  Nonspecific ST abnormality  Abnormal ECG  When compared with ECG of 15-MAY-2017 18:15, (Unconfirmed)  No significant change was found  Confirmed by NAKUL GOMEZ ([id number]) on 5/16/2017 8:23:41 PM        EXAM:  CT ABDOMEN AND PELVIS IC                          EXAM:  CT ANGIO CHEST (W)AW IC                            PROCEDURE DATE:  05/15/2017        INTERPRETATION:  EXAMINATION DATE: May 15, 2017 at 2006 hours.  CLINICAL INFORMATION: Chest pain, tachycardia, abdominal pain.    COMPARISON: None.    PROCEDURE:   CTA of the Chest, CT Abdomen and Pelvis was performed with intravenous   contrast. CTA of the chest was performed using angiographic protocol.  Intravenous contrast: 95 mL Omnipaque 350. 5 mL discarded.  Oral contrast: None.  Sagittal and coronal reformats were performed. MIP images were obtained.    FINDINGS:    CHEST:     LUNGS AND LARGE AIRWAYS: Subsegmental atelectasis in the lingula.  PLEURA: No pleural effusion.  VESSELS: Suboptimal contrast opacification of the peripheral pulmonary   arteries. No central pulmonary embolus. Atherosclerotic calcifications.  HEART: Heart size is normal. No pericardial effusion.  MEDIASTINUM AND JUDY: No lymphadenopathy.  CHEST WALL AND LOWERNECK: Within normal limits.    ABDOMEN AND PELVIS:    LIVER: Within normal limits.  BILE DUCTS: Normal caliber.   GALLBLADDER: Within normal limits.  SPLEEN: Within normal limits.  PANCREAS: Within normal limits.  ADRENALS: Within normal limits.  KIDNEYS/URETERS: Left renal cyst. Hypodense lesion in the right kidney is   too small to characterize. No hydronephrosis of either kidney.     BLADDER: Within normal limits.  REPRODUCTIVE ORGANS: Thickened endometrium, measuring 1.4 cm.    BOWEL: No bowel obstruction. Normal appendix.    PERITONEUM: No ascites.  VESSELS:  Atherosclerotic change of the abdominal aorta and its branches.  RETROPERITONEUM: No lymphadenopathy.    ABDOMINAL WALL: Within normal limits.  BONES: Degenerative changes of the spine.    IMPRESSION:   1.  Suboptimal study. No central pulmonary embolus.  2.  Thickened endometrium, measuring 1.4 cm. Correlation with   hysterosonography, direct visualization, or pelvic MRI is recommended.                  CHRIS QUEZADA M.D., ATTENDING RADIOLOGIST  This document has been electronically signed. May 15 2017  8:32PM

## 2017-05-17 NOTE — CONSULT NOTE ADULT - PROBLEM SELECTOR RECOMMENDATION 9
liquid diet, IVF.    Check IGG-4, Triglycerides, trend amylase and lipase and cbc.  pain control  mrcp negative  advance diet as tolerated  no further work at this time

## 2017-05-17 NOTE — PROGRESS NOTE ADULT - SUBJECTIVE AND OBJECTIVE BOX
INTERVAL HPI/OVERNIGHT EVENTS:  HPI:  This is a 86 YO W Female  transferred from [facility] for Low Hgb.  And abnormal lab result.  Patient with past medical history of anemia, COPD, alzheimer's disease  Pt doesn't know why she is in the ED and denies any pain. Hx is limited secondary to pt's mental status.  no pain tolerating diet      MEDICATIONS  (STANDING):  docusate sodium 200milliGRAM(s) Oral daily  senna 1Tablet(s) Oral daily  nicotine - 21 mG/24Hr(s) Patch 1patch Transdermal daily  multivitamin 1Tablet(s) Oral daily  pantoprazole    Tablet 40milliGRAM(s) Oral before breakfast  sucralfate suspension 1Gram(s) Oral two times a day  risperiDONE   Tablet 0.25milliGRAM(s) Oral two times a day  buDESOnide 160 MICROgram(s)/formoterol 4.5 MICROgram(s) Inhaler 2Puff(s) Inhalation two times a day  dextrose 5% + sodium chloride 0.9%. 1000milliLiter(s) IV Continuous <Continuous>    MEDICATIONS  (PRN):  oxyCODONE IR 2.5milliGRAM(s) Oral every 4 hours PRN Moderate Pain (4 - 6)  haloperidol    Injectable 2.5milliGRAM(s) IntraMuscular every 6 hours PRN Agitation      Allergies    No Known Allergies    Intolerances          General:  No wt loss, fevers, chills, night sweats, fatigue,   Eyes:  Good vision, no reported pain  ENT:  No sore throat, pain, runny nose, dysphagia  CV:  No pain, palpitations, hypo/hypertension  Resp:  No dyspnea, cough, tachypnea, wheezing  GI:  No pain, No nausea, No vomiting, No diarrhea, No constipation, No weight loss, No fever, No pruritis, No rectal bleeding, No tarry stools, No dysphagia,  :  No pain, bleeding, incontinence, nocturia  Muscle:  No pain, weakness  Neuro:  No weakness, tingling, memory problems  Psych:  No fatigue, insomnia, mood problems, depression  Endocrine:  No polyuria, polydipsia, cold/heat intolerance  Heme:  No petechiae, ecchymosis, easy bruisability  Skin:  No rash, tattoos, scars, edema      PHYSICAL EXAM:   Vital Signs:  Vital Signs Last 24 Hrs  T(C): 36.4, Max: 36.6 (05-16 @ 11:45)  T(F): 97.6, Max: 97.8 (05-16 @ 11:45)  HR: 97 (88 - 105)  BP: 120/61 (120/61 - 168/66)  BP(mean): --  RR: 18 (18 - 20)  SpO2: 96% (96% - 99%)  Daily     Daily I&O's Summary    I & Os for current day (as of 17 May 2017 11:07)  =============================================  IN: 700 ml / OUT: 0 ml / NET: 700 ml      GENERAL:  Appears stated age, well-groomed, well-nourished, no distress  HEENT:  NC/AT,  conjunctivae clear and pink, no thyromegaly, nodules, adenopathy, no JVD, sclera -anicteric  CHEST:  Full & symmetric excursion, no increased effort, breath sounds clear  HEART:  Regular rhythm, S1, S2, no murmur/rub/S3/S4, no abdominal bruit, no edema  ABDOMEN:  Soft, non-tender, non-distended, normoactive bowel sounds,  no masses ,no hepato-splenomegaly, no signs of chronic liver disease  EXTEREMITIES:  no cyanosis,clubbing or edema  SKIN:  No rash/erythema/ecchymoses/petechiae/wounds/abscess/warm/dry  NEURO:  Alert, oriented, no asterixis, no tremor, no encephalopathy      LABS:                        10.8   9.8   )-----------( 570      ( 17 May 2017 07:13 )             32.6     05-17    136  |  102  |  8   ----------------------------<  103<H>  3.9   |  28  |  0.64    Ca    8.9      17 May 2017 07:13    TPro  5.5<L>  /  Alb  x   /  TBili  x   /  DBili  x   /  AST  x   /  ALT  x   /  AlkPhos  x   05-16    PT/INR - ( 17 May 2017 07:13 )   PT: 10.6 sec;   INR: 0.97 ratio         PTT - ( 17 May 2017 07:13 )  PTT:26.2 sec    amylase   lipase  RADIOLOGY & ADDITIONAL TESTS:

## 2017-05-17 NOTE — PROGRESS NOTE ADULT - PROBLEM SELECTOR PLAN 1
NPO, IVF.    Check IGG-4, Triglycerides, trend amylase and lipase and cbc.  pain control  advance diet  surgery consult  to follow lipase   d/c planning in am if stable

## 2017-05-17 NOTE — PROGRESS NOTE ADULT - PROBLEM SELECTOR PLAN 3
-sinus tach likely 2/2 dehydration, will cont to monitor on tele  -rehydrate  -f/u TTE results  -f/u cardio recs  -left carotid bruit auscultated-will order b/l carotid dopplers-f/u results  -CT angio neg for PE -pt also with decreased PO intake  -start colace and miralax daily  -as per GI, added maalox q6 PRN and glycerin suppository x 1 today. will add lactulose 10cc if no BM in the next 24 hours

## 2017-05-17 NOTE — PROGRESS NOTE ADULT - PROBLEM SELECTOR PLAN 2
start bowel regimen  add lactulose 10cc q 6 hours and glycerin suppository  in and out  gas pill with simethicone and maalox q 6 hours

## 2017-05-17 NOTE — PROGRESS NOTE ADULT - PROBLEM SELECTOR PLAN 4
-pt also with decreased PO intake  -start colace and miralax daily  -as per GI, added maalox q6 PRN and glycerin suppository x 1 today. will add lactulose 10cc q6 if no BM in the next 24 hours -actually thickened on CT Abd/P, but pt should have transvaginal US as outpt with a GYN physician and endometrial biopsy -- w/up as outpatient.

## 2017-05-17 NOTE — PROGRESS NOTE ADULT - ASSESSMENT
82 y/o female with PMHx HTN, HLD, early menopause at age 31, b/l cataracts admitted with generalized abdominal pain likely 2/2 resolving acute pancreatitis and sinus tachycardia likely 2/2 dehydration.

## 2017-05-17 NOTE — PROGRESS NOTE ADULT - PROBLEM SELECTOR PLAN 6
ASA 81mg daily  DVT ppx: Lovenox 40mg SQ qd -holding HCTZ as pt is on IVF and may still be somewhat dehydrated  -cont Lisinopril 5mg daily with hold parameters  -BP stable with SBP < 140s

## 2017-05-17 NOTE — PROGRESS NOTE ADULT - SUBJECTIVE AND OBJECTIVE BOX
Patient is a 81y old  Female who presents with a chief complaint of abdominal pain/chest pain (16 May 2017 07:47)      INTERVAL HPI/OVERNIGHT EVENTS: No overnight events. Per tele monitor, pt still in sinus tachycardia with any movement or exertion, up to 140s. Pt denies abd pain, nausea, vomiting. Pt tolerating clear liquid diet.    MEDICATIONS  (STANDING):  lisinopril 5milliGRAM(s) Oral daily  cyanocobalamin 1000MICROGram(s) Oral daily  enoxaparin Injectable 40milliGRAM(s) SubCutaneous every 24 hours  aspirin enteric coated 81milliGRAM(s) Oral daily  lactated ringers. 1000milliLiter(s) IV Continuous <Continuous>  docusate sodium 100milliGRAM(s) Oral daily  polyethylene glycol 3350 17Gram(s) Oral daily  famotidine    Tablet 20milliGRAM(s) Oral two times a day  glycerin Suppository - Adult 1Suppository(s) Rectal once    MEDICATIONS  (PRN):  acetaminophen   Tablet. 650milliGRAM(s) Oral every 6 hours PRN Mild Pain (1 - 3)  aluminum hydroxide/magnesium hydroxide/simethicone Suspension 30milliLiter(s) Oral every 6 hours PRN Dyspepsia      Allergies    No Known Allergies    Intolerances        REVIEW OF SYSTEMS:  CONSTITUTIONAL: No fever, no chills, no myalgias  HEENT: no HA, no sore throat, no blurry vision  RESPIRATORY: No cough, no wheezing, no SOB  CARDIOVASCULAR: No chest pain, no palpitations  GASTROINTESTINAL:no abdominal pain, no nausea, no vomiting; no diarrhea, +constipation.   GENITOURINARY: No dysuria, No hematuria.   EXT: no edema, no cyanosis, no open wounds, no calf tenderness  NEUROLOGICAL:  No headaches  MUSCULOSKELETAL: no body aches or joint pain  ROS  [ ] Unable to obtain     Height (cm): 157.5 (05-16 @ 02:35)  Weight (kg): 60 (05-16 @ 02:35)  BMI (kg/m2): 24.2 (05-16 @ 02:35)  BSA (m2): 1.6 (05-16 @ 02:35)  Vital Signs Last 24 Hrs  T(C): 36.9, Max: 37.1 (05-16 @ 15:57)  T(F): 98.5, Max: 98.8 (05-16 @ 15:57)  HR: 110 (105 - 117)  BP: 133/80 (133/80 - 139/75)  BP(mean): --  RR: 19 (17 - 19)  SpO2: 96% (92% - 96%)    PHYSICAL EXAM:  GENERAL:  No acute distress, [ ] Agitated, [ ] Lethargy, [ ] confused   ENMT: PERRL, EOMI, conjunctiva clear  NEURO:  answering questions and following commands appropriately, no focal deficits [ ]Confusion  [ ] Encephalopathic [ ] Sedated [ ] Other  RESPIRATORY: Clear to auscultation bilaterally,  [ ] decreased breath sounds at bases  [ ] wheezing   [ ] rhonchi  [ ] crackles  CARDIOVASCULAR: RRR, No murmurs, +S1, S2  [ ] irregular   ABDOMEN:  soft, ND, NT, +BS x 4  EXTREMITIES: no edema or cyanosis    LABS:                        10.9   7.5   )-----------( 146      ( 17 May 2017 07:10 )             33.5     17 May 2017 07:10    142    |  105    |  16     ----------------------------<  90     3.7     |  28     |  0.59     Ca    9.2        17 May 2017 07:10  Phos  3.9       17 May 2017 07:10  Mg     1.6       17 May 2017 07:10    TPro  6.0    /  Alb  2.7    /  TBili  0.8    /  DBili  x      /  AST  28     /  ALT  34     /  AlkPhos  40     17 May 2017 07:10    PT/INR - ( 15 May 2017 18:53 )   PT: 11.9 sec;   INR: 1.09 ratio               CAPILLARY BLOOD GLUCOSE    Cultures          RADIOLOGY & ADDITIONAL TESTS: personally reviewed.  CONSULTANT NOTES: personally reviewed, appreciate recommendations to coordinate plan of care.      Care Discussed with: [X ] Patient  [ ] Family  [X] RN  DVT prophylaxis: [X ] SQ Lovenox   [ ] SQ Heparin  [ ] Coumadin  [ ] SCDs [ ] ambulating frequently/pt low risk [ ] contraindicated            [ ] other Patient is a 81y old  Female who presents with a chief complaint of abdominal pain/chest pain (16 May 2017 07:47)      INTERVAL HPI/OVERNIGHT EVENTS: No overnight events. Per tele monitor, pt still in sinus tachycardia with any movement or exertion, up to 140s. Pt denies abd pain, nausea, vomiting. Pt tolerating clear liquid diet.    MEDICATIONS  (STANDING):  lisinopril 5milliGRAM(s) Oral daily  cyanocobalamin 1000MICROGram(s) Oral daily  enoxaparin Injectable 40milliGRAM(s) SubCutaneous every 24 hours  aspirin enteric coated 81milliGRAM(s) Oral daily  lactated ringers. 1000milliLiter(s) IV Continuous <Continuous>  docusate sodium 100milliGRAM(s) Oral daily  polyethylene glycol 3350 17Gram(s) Oral daily  famotidine    Tablet 20milliGRAM(s) Oral two times a day  glycerin Suppository - Adult 1Suppository(s) Rectal once    MEDICATIONS  (PRN):  acetaminophen   Tablet. 650milliGRAM(s) Oral every 6 hours PRN Mild Pain (1 - 3)  aluminum hydroxide/magnesium hydroxide/simethicone Suspension 30milliLiter(s) Oral every 6 hours PRN Dyspepsia      Allergies    No Known Allergies    Intolerances        REVIEW OF SYSTEMS:  CONSTITUTIONAL: No fever, no chills, no myalgias  HEENT: no HA, no sore throat, no blurry vision  RESPIRATORY: No cough, no wheezing, no SOB  CARDIOVASCULAR: No chest pain, no palpitations  GASTROINTESTINAL:no abdominal pain, no nausea, no vomiting; no diarrhea, +constipation.   GENITOURINARY: No dysuria, No hematuria.   EXT: no edema, no cyanosis, no open wounds, no calf tenderness  NEUROLOGICAL:  No headaches  MUSCULOSKELETAL: no body aches or joint pain  ROS  [ ] Unable to obtain     Height (cm): 157.5 (05-16 @ 02:35)  Weight (kg): 60 (05-16 @ 02:35)  BMI (kg/m2): 24.2 (05-16 @ 02:35)  BSA (m2): 1.6 (05-16 @ 02:35)  Vital Signs Last 24 Hrs  T(C): 36.9, Max: 37.1 (05-16 @ 15:57)  T(F): 98.5, Max: 98.8 (05-16 @ 15:57)  HR: 110 (105 - 117)  BP: 133/80 (133/80 - 139/75)  BP(mean): --  RR: 19 (17 - 19)  SpO2: 96% (92% - 96%)    PHYSICAL EXAM:  GENERAL:  No acute distress, [ ] Agitated, [ ] Lethargy, [ ] confused   ENMT: PERRL, EOMI, conjunctiva clear  NEURO:  answering questions and following commands appropriately, no focal deficits [ ]Confusion  [ ] Encephalopathic [ ] Sedated [ ] Other  RESPIRATORY: Clear to auscultation bilaterally,  [ ] decreased breath sounds at bases  [ ] wheezing   [ ] rhonchi  [ ] crackles  CARDIOVASCULAR: RRR, No murmurs, +S1, S2  [ ] irregular   ABDOMEN:  soft, non-distended, non-tender, +BS x 4  EXTREMITIES: no edema or cyanosis    LABS:                        10.9   7.5   )-----------( 146      ( 17 May 2017 07:10 )             33.5     17 May 2017 07:10    142    |  105    |  16     ----------------------------<  90     3.7     |  28     |  0.59     Ca    9.2        17 May 2017 07:10  Phos  3.9       17 May 2017 07:10  Mg     1.6       17 May 2017 07:10    TPro  6.0    /  Alb  2.7    /  TBili  0.8    /  DBili  x      /  AST  28     /  ALT  34     /  AlkPhos  40     17 May 2017 07:10    PT/INR - ( 15 May 2017 18:53 )   PT: 11.9 sec;   INR: 1.09 ratio               CAPILLARY BLOOD GLUCOSE    Cultures          RADIOLOGY & ADDITIONAL TESTS: personally reviewed.  CONSULTANT NOTES: personally reviewed, appreciate recommendations to coordinate plan of care.      Care Discussed with: [X ] Patient  [ ] Family  [X] RN  DVT prophylaxis: [X ] SQ Lovenox   [ ] SQ Heparin  [ ] Coumadin  [ ] SCDs [ ] ambulating frequently/pt low risk [ ] contraindicated            [ ] other

## 2017-05-17 NOTE — PROGRESS NOTE ADULT - ASSESSMENT
82 y/o female with PMHx HTN, HLD, early menopause at age 31, b/l cataracts presents with a one week history of generalized abdominal pain and left chest pain.   - The patient's chest pain is unlikely related to an acute coronary syndrome. The cardiac enzyme profile supports this.  EKG is without ischemic changes.   - Check echo.   - Tachycardia is likely a result of significant volume depletion Hold diuretics.   - gentle hydration  - Rx for pancreatitis.   - Monitor and replete electrolytes. Keep K>4.0 and Mg>2.0.  - Further cardiac workup will depend on clinical course.   - All other workup per primary team. Will followup.

## 2017-05-18 VITALS
HEART RATE: 90 BPM | RESPIRATION RATE: 16 BRPM | DIASTOLIC BLOOD PRESSURE: 55 MMHG | SYSTOLIC BLOOD PRESSURE: 132 MMHG | OXYGEN SATURATION: 97 % | TEMPERATURE: 98 F

## 2017-05-18 DIAGNOSIS — R10.13 EPIGASTRIC PAIN: ICD-10-CM

## 2017-05-18 DIAGNOSIS — E05.90 THYROTOXICOSIS, UNSPECIFIED WITHOUT THYROTOXIC CRISIS OR STORM: ICD-10-CM

## 2017-05-18 DIAGNOSIS — R93.8 ABNORMAL FINDINGS ON DIAGNOSTIC IMAGING OF OTHER SPECIFIED BODY STRUCTURES: ICD-10-CM

## 2017-05-18 LAB
ALBUMIN SERPL ELPH-MCNC: 2.9 G/DL — LOW (ref 3.3–5)
ALP SERPL-CCNC: 44 U/L — SIGNIFICANT CHANGE UP (ref 40–120)
ALT FLD-CCNC: 34 U/L — SIGNIFICANT CHANGE UP (ref 12–78)
ANION GAP SERPL CALC-SCNC: 10 MMOL/L — SIGNIFICANT CHANGE UP (ref 5–17)
AST SERPL-CCNC: 28 U/L — SIGNIFICANT CHANGE UP (ref 15–37)
BASOPHILS # BLD AUTO: 0 K/UL — SIGNIFICANT CHANGE UP (ref 0–0.2)
BASOPHILS NFR BLD AUTO: 0.4 % — SIGNIFICANT CHANGE UP (ref 0–2)
BILIRUB SERPL-MCNC: 0.7 MG/DL — SIGNIFICANT CHANGE UP (ref 0.2–1.2)
BUN SERPL-MCNC: 17 MG/DL — SIGNIFICANT CHANGE UP (ref 7–23)
CALCIUM SERPL-MCNC: 9.4 MG/DL — SIGNIFICANT CHANGE UP (ref 8.5–10.1)
CHLORIDE SERPL-SCNC: 108 MMOL/L — SIGNIFICANT CHANGE UP (ref 96–108)
CO2 SERPL-SCNC: 27 MMOL/L — SIGNIFICANT CHANGE UP (ref 22–31)
CREAT SERPL-MCNC: 0.62 MG/DL — SIGNIFICANT CHANGE UP (ref 0.5–1.3)
EOSINOPHIL # BLD AUTO: 0.1 K/UL — SIGNIFICANT CHANGE UP (ref 0–0.5)
EOSINOPHIL NFR BLD AUTO: 1.4 % — SIGNIFICANT CHANGE UP (ref 0–6)
GLUCOSE SERPL-MCNC: 94 MG/DL — SIGNIFICANT CHANGE UP (ref 70–99)
HCT VFR BLD CALC: 33.5 % — LOW (ref 34.5–45)
HGB BLD-MCNC: 11 G/DL — LOW (ref 11.5–15.5)
LIDOCAIN IGE QN: 693 U/L — HIGH (ref 73–393)
LYMPHOCYTES # BLD AUTO: 2.1 K/UL — SIGNIFICANT CHANGE UP (ref 1–3.3)
LYMPHOCYTES # BLD AUTO: 28.2 % — SIGNIFICANT CHANGE UP (ref 13–44)
MAGNESIUM SERPL-MCNC: 1.8 MG/DL — SIGNIFICANT CHANGE UP (ref 1.6–2.6)
MCHC RBC-ENTMCNC: 29.3 PG — SIGNIFICANT CHANGE UP (ref 27–34)
MCHC RBC-ENTMCNC: 32.6 GM/DL — SIGNIFICANT CHANGE UP (ref 32–36)
MCV RBC AUTO: 89.7 FL — SIGNIFICANT CHANGE UP (ref 80–100)
MONOCYTES # BLD AUTO: 0.8 K/UL — SIGNIFICANT CHANGE UP (ref 0–0.9)
MONOCYTES NFR BLD AUTO: 10.1 % — HIGH (ref 1–9)
NEUTROPHILS # BLD AUTO: 4.5 K/UL — SIGNIFICANT CHANGE UP (ref 1.8–7.4)
NEUTROPHILS NFR BLD AUTO: 59.8 % — SIGNIFICANT CHANGE UP (ref 43–77)
PHOSPHATE SERPL-MCNC: 4.1 MG/DL — SIGNIFICANT CHANGE UP (ref 2.5–4.5)
PLATELET # BLD AUTO: 170 K/UL — SIGNIFICANT CHANGE UP (ref 150–400)
POTASSIUM SERPL-MCNC: 3.9 MMOL/L — SIGNIFICANT CHANGE UP (ref 3.5–5.3)
POTASSIUM SERPL-SCNC: 3.9 MMOL/L — SIGNIFICANT CHANGE UP (ref 3.5–5.3)
PROT SERPL-MCNC: 6.6 G/DL — SIGNIFICANT CHANGE UP (ref 6–8.3)
RBC # BLD: 3.74 M/UL — LOW (ref 3.8–5.2)
RBC # FLD: 10.6 % — SIGNIFICANT CHANGE UP (ref 10.3–14.5)
SODIUM SERPL-SCNC: 145 MMOL/L — SIGNIFICANT CHANGE UP (ref 135–145)
T3 SERPL-MCNC: 279 NG/DL — HIGH (ref 80–200)
T4 FREE SERPL-MCNC: 4.3 NG/DL — HIGH (ref 0.9–1.8)
THYROPEROXIDASE AB SERPL-ACNC: <10 IU/ML — SIGNIFICANT CHANGE UP (ref 0–34)
WBC # BLD: 7.6 K/UL — SIGNIFICANT CHANGE UP (ref 3.8–10.5)
WBC # FLD AUTO: 7.6 K/UL — SIGNIFICANT CHANGE UP (ref 3.8–10.5)

## 2017-05-18 PROCEDURE — 82553 CREATINE MB FRACTION: CPT

## 2017-05-18 PROCEDURE — 84445 ASSAY OF TSI GLOBULIN: CPT

## 2017-05-18 PROCEDURE — 99232 SBSQ HOSP IP/OBS MODERATE 35: CPT

## 2017-05-18 PROCEDURE — 82150 ASSAY OF AMYLASE: CPT

## 2017-05-18 PROCEDURE — 80053 COMPREHEN METABOLIC PANEL: CPT

## 2017-05-18 PROCEDURE — 99239 HOSP IP/OBS DSCHRG MGMT >30: CPT

## 2017-05-18 PROCEDURE — 76705 ECHO EXAM OF ABDOMEN: CPT

## 2017-05-18 PROCEDURE — 82787 IGG 1 2 3 OR 4 EACH: CPT

## 2017-05-18 PROCEDURE — 71275 CT ANGIOGRAPHY CHEST: CPT

## 2017-05-18 PROCEDURE — 80061 LIPID PANEL: CPT

## 2017-05-18 PROCEDURE — 84480 ASSAY TRIIODOTHYRONINE (T3): CPT

## 2017-05-18 PROCEDURE — 84443 ASSAY THYROID STIM HORMONE: CPT

## 2017-05-18 PROCEDURE — 83735 ASSAY OF MAGNESIUM: CPT

## 2017-05-18 PROCEDURE — 83690 ASSAY OF LIPASE: CPT

## 2017-05-18 PROCEDURE — 74183 MRI ABD W/O CNTR FLWD CNTR: CPT

## 2017-05-18 PROCEDURE — 85027 COMPLETE CBC AUTOMATED: CPT

## 2017-05-18 PROCEDURE — 93880 EXTRACRANIAL BILAT STUDY: CPT

## 2017-05-18 PROCEDURE — 86376 MICROSOMAL ANTIBODY EACH: CPT

## 2017-05-18 PROCEDURE — 93005 ELECTROCARDIOGRAM TRACING: CPT

## 2017-05-18 PROCEDURE — 84484 ASSAY OF TROPONIN QUANT: CPT

## 2017-05-18 PROCEDURE — 74177 CT ABD & PELVIS W/CONTRAST: CPT

## 2017-05-18 PROCEDURE — 71045 X-RAY EXAM CHEST 1 VIEW: CPT

## 2017-05-18 PROCEDURE — 99285 EMERGENCY DEPT VISIT HI MDM: CPT | Mod: 25

## 2017-05-18 PROCEDURE — A9579: CPT

## 2017-05-18 PROCEDURE — 84439 ASSAY OF FREE THYROXINE: CPT

## 2017-05-18 PROCEDURE — 84100 ASSAY OF PHOSPHORUS: CPT

## 2017-05-18 PROCEDURE — 80048 BASIC METABOLIC PNL TOTAL CA: CPT

## 2017-05-18 PROCEDURE — 82550 ASSAY OF CK (CPK): CPT

## 2017-05-18 PROCEDURE — 76536 US EXAM OF HEAD AND NECK: CPT

## 2017-05-18 PROCEDURE — 85610 PROTHROMBIN TIME: CPT

## 2017-05-18 PROCEDURE — 93306 TTE W/DOPPLER COMPLETE: CPT

## 2017-05-18 RX ORDER — METHIMAZOLE 10 MG/1
2 TABLET ORAL
Qty: 60 | Refills: 0 | OUTPATIENT
Start: 2017-05-18 | End: 2017-06-17

## 2017-05-18 RX ORDER — FAMOTIDINE 10 MG/ML
1 INJECTION INTRAVENOUS
Qty: 28 | Refills: 0 | OUTPATIENT
Start: 2017-05-18 | End: 2017-06-01

## 2017-05-18 RX ORDER — ATORVASTATIN CALCIUM 80 MG/1
1 TABLET, FILM COATED ORAL
Qty: 30 | Refills: 0 | OUTPATIENT
Start: 2017-05-18 | End: 2017-06-17

## 2017-05-18 RX ORDER — ASPIRIN/CALCIUM CARB/MAGNESIUM 324 MG
1 TABLET ORAL
Qty: 30 | Refills: 0 | OUTPATIENT
Start: 2017-05-18 | End: 2017-06-17

## 2017-05-18 RX ORDER — ATORVASTATIN CALCIUM 80 MG/1
10 TABLET, FILM COATED ORAL AT BEDTIME
Qty: 0 | Refills: 0 | Status: DISCONTINUED | OUTPATIENT
Start: 2017-05-18 | End: 2017-05-18

## 2017-05-18 RX ORDER — METOPROLOL TARTRATE 50 MG
1 TABLET ORAL
Qty: 30 | Refills: 0 | OUTPATIENT
Start: 2017-05-18 | End: 2017-06-17

## 2017-05-18 RX ORDER — FAMOTIDINE 10 MG/ML
20 INJECTION INTRAVENOUS
Qty: 0 | Refills: 0 | Status: DISCONTINUED | OUTPATIENT
Start: 2017-05-18 | End: 2017-05-18

## 2017-05-18 RX ADMIN — Medication 25 MILLIGRAM(S): at 06:12

## 2017-05-18 RX ADMIN — ENOXAPARIN SODIUM 40 MILLIGRAM(S): 100 INJECTION SUBCUTANEOUS at 12:04

## 2017-05-18 RX ADMIN — Medication 81 MILLIGRAM(S): at 12:05

## 2017-05-18 RX ADMIN — POLYETHYLENE GLYCOL 3350 17 GRAM(S): 17 POWDER, FOR SOLUTION ORAL at 12:04

## 2017-05-18 RX ADMIN — FAMOTIDINE 20 MILLIGRAM(S): 10 INJECTION INTRAVENOUS at 17:32

## 2017-05-18 RX ADMIN — LISINOPRIL 5 MILLIGRAM(S): 2.5 TABLET ORAL at 06:12

## 2017-05-18 RX ADMIN — PREGABALIN 1000 MICROGRAM(S): 225 CAPSULE ORAL at 12:05

## 2017-05-18 RX ADMIN — Medication 100 MILLIGRAM(S): at 12:03

## 2017-05-18 NOTE — PROGRESS NOTE ADULT - SUBJECTIVE AND OBJECTIVE BOX
INTERVAL HPI/OVERNIGHT EVENTS:  HPI:  80 y/o female with PMHx HTN, HLD, early menopause at age 31, b/l cataracts presents with a one week history of generalized abdominal pain and left chest pain. Admits to dyspnea on exertion while walking up and down the stairs. Chest pain was non-radiating and 6/10 in intensity. Has chronic left shoulder pain. Patient son (Adarsh) at bedside translating and also states that patient has had decreased appetite which improved the day prior to admission but returned on day of admission. Patient states that abdominal discomfort improves with burping. Had one episode of NBNB vomiting 4 days prior to admission. Currently has no chest pain. Admits to a sensation of a pulsatile thumping in her left ear. Denies palpitations, nausea, diarrhea, vaginal bleeding, BRBPR, melena.    In the ED, patient's labs significant for elevated lipase of 593, first set CE's neg. EKG showed sinus tachycardia. CXR showed no focal consolidation or pleural effusion. CT Angio Chest was done to r/o PE: showed no central PE. CT abd/pelvis showed thickened endometrium, measuring 1.4 cm. Correlation with hysterosonography, direct visualization, or pelvic MRI recommended.  No new overnight event.  No N/V/D.  Tolerating diet.      Vital Signs Last 24 Hrs  T(C): 37, Max: 37.1 (05-15 @ 17:55)  T(F): 98.6, Max: 98.8 (05-15 @ 17:55)  HR: 98 (98 - 110)  BP: 143/72 (136/65 - 143/72)  BP(mean): --  RR: 15 (15 - 18)  SpO2: 98% (98% - 98%)    Dr. Griggs (cardio) consulted recs to admit patient (15 May 2017 23:00)    MEDICATIONS  (STANDING):  lisinopril 5milliGRAM(s) Oral daily  cyanocobalamin 1000MICROGram(s) Oral daily  enoxaparin Injectable 40milliGRAM(s) SubCutaneous every 24 hours  aspirin enteric coated 81milliGRAM(s) Oral daily  docusate sodium 100milliGRAM(s) Oral daily  polyethylene glycol 3350 17Gram(s) Oral daily  methimazole 20milliGRAM(s) Oral daily  metoprolol succinate ER 25milliGRAM(s) Oral daily  atorvastatin 10milliGRAM(s) Oral at bedtime  famotidine    Tablet 20milliGRAM(s) Oral two times a day    MEDICATIONS  (PRN):  acetaminophen   Tablet. 650milliGRAM(s) Oral every 6 hours PRN Mild Pain (1 - 3)  aluminum hydroxide/magnesium hydroxide/simethicone Suspension 30milliLiter(s) Oral every 6 hours PRN Dyspepsia      Allergies    No Known Allergies    Intolerances          General:  No wt loss, fevers, chills, night sweats, fatigue,   Eyes:  Good vision, no reported pain  ENT:  No sore throat, pain, runny nose, dysphagia  CV:  No pain, palpitations, hypo/hypertension  Resp:  No dyspnea, cough, tachypnea, wheezing  GI:  No pain, No nausea, No vomiting, No diarrhea, No constipation, No weight loss, No fever, No pruritis, No rectal bleeding, No tarry stools, No dysphagia,  :  No pain, bleeding, incontinence, nocturia  Muscle:  No pain, weakness  Neuro:  No weakness, tingling, memory problems  Psych:  No fatigue, insomnia, mood problems, depression  Endocrine:  No polyuria, polydipsia, cold/heat intolerance  Heme:  No petechiae, ecchymosis, easy bruisability  Skin:  No rash, tattoos, scars, edema      PHYSICAL EXAM:   Vital Signs:  Vital Signs Last 24 Hrs  T(C): 36.4, Max: 36.8 (05-17 @ 15:31)  T(F): 97.5, Max: 98.2 (05-17 @ 15:31)  HR: 86 (84 - 98)  BP: 150/68 (113/72 - 150/68)  BP(mean): --  RR: 16 (16 - 17)  SpO2: 97% (95% - 97%)  Daily     Daily I&O's Summary    I & Os for current day (as of 18 May 2017 12:59)  =============================================  IN: 1800 ml / OUT: 0 ml / NET: 1800 ml      GENERAL:  Appears stated age, well-groomed, well-nourished, no distress  HEENT:  NC/AT,  conjunctivae clear and pink, no thyromegaly, nodules, adenopathy, no JVD, sclera -anicteric  CHEST:  Full & symmetric excursion, no increased effort, breath sounds clear  HEART:  Regular rhythm, S1, S2, no murmur/rub/S3/S4, no abdominal bruit, no edema  ABDOMEN:  Soft, non-tender, non-distended, normoactive bowel sounds,  no masses ,no hepato-splenomegaly, no signs of chronic liver disease  EXTEREMITIES:  no cyanosis,clubbing or edema  SKIN:  No rash/erythema/ecchymoses/petechiae/wounds/abscess/warm/dry  NEURO:  Alert, oriented, no asterixis, no tremor, no encephalopathy      LABS:                        11.0   7.6   )-----------( 170      ( 18 May 2017 07:18 )             33.5     05-18    145  |  108  |  17  ----------------------------<  94  3.9   |  27  |  0.62    Ca    9.4      18 May 2017 07:18  Phos  4.1     05-18  Mg     1.8     05-18    TPro  6.6  /  Alb  2.9<L>  /  TBili  0.7  /  DBili  x   /  AST  28  /  ALT  34  /  AlkPhos  44  05-18        amylaseAmylase, Serum Total: 86 U/L (05-16 @ 05:58)     lipaseLipase, Serum: 693 U/L (05-18 @ 07:18)  Lipase, Serum: 551 U/L (05-17 @ 07:10)  Lipase, Serum: 546 U/L (05-16 @ 05:58)  Lipase, Serum: 563 U/L (05-15 @ 18:53)    RADIOLOGY & ADDITIONAL TESTS:

## 2017-05-18 NOTE — CONSULT NOTE ADULT - SUBJECTIVE AND OBJECTIVE BOX
Patient is a 81y old  Female who presents with a chief complaint of abdominal pain/chest pain (16 May 2017 07:47)      Reason For Consult:     HPI:  82 y/o female with PMHx HTN, HLD, early menopause at age 31, b/l cataracts presents with a one week history of generalized abdominal pain and left chest pain. Admits to dyspnea on exertion while walking up and down the stairs. Chest pain was non-radiating and 6/10 in intensity. Has chronic left shoulder pain. Patient son (Adarsh) at bedside translating and also states that patient has had decreased appetite which improved the day prior to admission but returned on day of admission. Patient states that abdominal discomfort improves with burping. Had one episode of NBNB vomiting 4 days prior to admission. Currently has no chest pain. Admits to a sensation of a pulsatile thumping in her left ear. Denies palpitations, nausea, diarrhea, vaginal bleeding, BRBPR, melena.    In the ED, patient's labs significant for elevated lipase of 593, first set CE's neg. EKG showed sinus tachycardia. CXR showed no focal consolidation or pleural effusion. CT Angio Chest was done to r/o PE: showed no central PE. CT abd/pelvis showed thickened endometrium, measuring 1.4 cm. Correlation with hysterosonography, direct visualization, or pelvic MRI recommended.    Vital Signs Last 24 Hrs  T(C): 37, Max: 37.1 (05-15 @ 17:55)  T(F): 98.6, Max: 98.8 (05-15 @ 17:55)  HR: 98 (98 - 110)  BP: 143/72 (136/65 - 143/72)  BP(mean): --  RR: 15 (15 - 18)  SpO2: 98% (98% - 98%)    Dr. Griggs (cardio) consulted recs to admit patient (15 May 2017 23:00)      PAST MEDICAL & SURGICAL HISTORY:  Cataracts, bilateral  Early menopause: at age 31  Hyperlipidemia  Hypertension  H/O cataract      FAMILY HISTORY:  No pertinent family history in first degree relatives        Social History:    MEDICATIONS  (STANDING):  lisinopril 5milliGRAM(s) Oral daily  cyanocobalamin 1000MICROGram(s) Oral daily  enoxaparin Injectable 40milliGRAM(s) SubCutaneous every 24 hours  aspirin enteric coated 81milliGRAM(s) Oral daily  docusate sodium 100milliGRAM(s) Oral daily  polyethylene glycol 3350 17Gram(s) Oral daily  methimazole 20milliGRAM(s) Oral daily  metoprolol succinate ER 25milliGRAM(s) Oral daily    MEDICATIONS  (PRN):  acetaminophen   Tablet. 650milliGRAM(s) Oral every 6 hours PRN Mild Pain (1 - 3)  aluminum hydroxide/magnesium hydroxide/simethicone Suspension 30milliLiter(s) Oral every 6 hours PRN Dyspepsia        T(C): 36.2, Max: 36.8 (05-17 @ 15:31)  HR: 90 (84 - 100)  BP: 119/70 (113/72 - 139/76)  RR: 17 (17 - 17)  SpO2: 96% (95% - 96%)  Wt(kg): --    PHYSICAL EXAM:  GENERAL: NAD, well-groomed, well-developed  HEAD:  Atraumatic, Normocephalic  NECK: Supple, No JVD, thyroid palpated  CHEST/LUNG: Clear to percussion bilaterally; No rales, rhonchi, wheezing, or rubs  HEART: Regular rate and rhythm; No murmurs, rubs, or gallops  ABDOMEN: Soft, Nontender, Nondistended; Bowel sounds present  EXTREMITIES:  2+ Peripheral Pulses, No clubbing, cyanosis, or edema  SKIN: No rashes or lesions    CAPILLARY BLOOD GLUCOSE                            11.0   7.6   )-----------( 170      ( 18 May 2017 07:18 )             33.5       CMP:  05-18 @ 07:18  SGPT 34  Albumin 2.9   Alk Phos 44   Anion Gap 10   SGOT 28   Total Bili 0.7   BUN 17   Calcium Total 9.4   CO2 27   Chloride 108   Creatinine 0.62   eGFR if AA 99   eGFR if non AA 85   Glucose 94   Potassium 3.9   Protein 6.6   Sodium 145      Thyroid Function Tests:  05-18 @ 02:07 TSH -- FreeT4 4.3 T3 279 Anti TPO -- Anti Thyroglobulin Ab -- TSI --  05-17 @ 20:27 TSH <0.01 FreeT4 -- T3 -- Anti TPO -- Anti Thyroglobulin Ab -- TSI --      Diabetes Tests:       Radiology:

## 2017-05-18 NOTE — CONSULT NOTE ADULT - PROBLEM SELECTOR RECOMMENDATION 9
?etiology  cont methimazole 20mg/day  cont beta blockade  will need garcia uptake and scan as outpt  recheck tfts in 2-3 weeks

## 2017-05-18 NOTE — PROGRESS NOTE ADULT - ASSESSMENT
80 y/o female with PMHx HTN, HLD, early menopause at age 31, b/l cataracts presents with a one week history of generalized abdominal pain and left chest pain.   - The patient's chest pain is unlikely related to an acute coronary syndrome. The cardiac enzyme profile supports this.  EKG is without ischemic changes.   - Check echo.   - Tachycardia is likely a result of hyperthyroid disease.  - she has severe PHTN. She is planning on being dc'd. no cpmplaints of dyspnea and no signs of RHF. can w/u as outpt.   - gentle hydration.   - Monitor and replete electrolytes. Keep K>4.0 and Mg>2.0.  - Further cardiac workup will depend on clinical course.   - All other workup per primary team. Will followup.  DC planning per primary team.

## 2017-05-18 NOTE — PROGRESS NOTE ADULT - SUBJECTIVE AND OBJECTIVE BOX
[facility] -- Mora Carmona Grossman, [place], Baljeet Biggs      Follow Up:  tachy    Subjective/Observations: Patient seen and examined. Events noted. Resting comfortably in bed. No complaints of chest pain, dyspnea, or palpitations reported.     PAST MEDICAL & SURGICAL HISTORY:  Cataracts, bilateral  Early menopause: at age 31  Hyperlipidemia  Hypertension  H/O cataract      MEDICATIONS  (STANDING):  lisinopril 5milliGRAM(s) Oral daily  cyanocobalamin 1000MICROGram(s) Oral daily  enoxaparin Injectable 40milliGRAM(s) SubCutaneous every 24 hours  aspirin enteric coated 81milliGRAM(s) Oral daily  docusate sodium 100milliGRAM(s) Oral daily  polyethylene glycol 3350 17Gram(s) Oral daily  methimazole 20milliGRAM(s) Oral daily  metoprolol succinate ER 25milliGRAM(s) Oral daily  atorvastatin 10milliGRAM(s) Oral at bedtime  famotidine    Tablet 20milliGRAM(s) Oral two times a day    MEDICATIONS  (PRN):  acetaminophen   Tablet. 650milliGRAM(s) Oral every 6 hours PRN Mild Pain (1 - 3)  aluminum hydroxide/magnesium hydroxide/simethicone Suspension 30milliLiter(s) Oral every 6 hours PRN Dyspepsia      Allergies    No Known Allergies    Intolerances        REVIEW OF SYSTEMS: All other review of systems is negative unless indicated above    Vital Signs Last 24 Hrs  T(C): 36.9, Max: 36.9 (05-18 @ 15:26)  T(F): 98.5, Max: 98.5 (05-18 @ 15:26)  HR: 90 (84 - 90)  BP: 132/55 (119/70 - 150/68)  BP(mean): --  RR: 16 (16 - 17)  SpO2: 97% (95% - 97%)    I&O's Summary    I & Os for current day (as of 19 May 2017 01:40)  =============================================  IN: 1800 ml / OUT: 0 ml / NET: 1800 ml        PHYSICAL EXAM:  TELE: SR-   Constitutional: NAD, awake and alert, well-developed  HEENT: Moist Mucous Membranes, Anicteric  Pulmonary: Non-labored, breath sounds are clear bilaterally, No wheezing, rales or rhonchi  Cardiovascular: Regular, S1 and S2, No murmurs, rubs, gallops oir clicks  Gastrointestinal: Bowel Sounds present, soft, nontender.   Lymph: No peripheral edema. No lymphadenopathy.  Skin: No visible rashes or ulcers.  Psych:  Mood & affect appropriate    LABS: All Labs Reviewed:                        11.0   7.6   )-----------( 170      ( 18 May 2017 07:18 )             33.5                         10.9   7.5   )-----------( 146      ( 17 May 2017 07:10 )             33.5                         11.8   6.6   )-----------( 160      ( 16 May 2017 05:58 )             36.1     18 May 2017 07:18    145    |  108    |  17     ----------------------------<  94     3.9     |  27     |  0.62   17 May 2017 07:10    142    |  105    |  16     ----------------------------<  90     3.7     |  28     |  0.59   16 May 2017 05:58    142    |  104    |  26     ----------------------------<  93     3.9     |  28     |  0.74     Ca    9.4        18 May 2017 07:18  Ca    9.2        17 May 2017 07:10  Ca    9.4        16 May 2017 05:58  Phos  4.1       18 May 2017 07:18  Phos  3.9       17 May 2017 07:10  Mg     1.8       18 May 2017 07:18  Mg     1.6       17 May 2017 07:10    TPro  6.6    /  Alb  2.9    /  TBili  0.7    /  DBili  x      /  AST  28     /  ALT  34     /  AlkPhos  44     18 May 2017 07:18  TPro  6.0    /  Alb  2.7    /  TBili  0.8    /  DBili  x      /  AST  28     /  ALT  34     /  AlkPhos  40     17 May 2017 07:10          Blood Culture:     05-17 @ 20:27  TSH: <0.01        EXAM:  ECHO TTE W/O CON COMP W/DOPPLR         PROCEDURE DATE:  05/17/2017        INTERPRETATION:  INDICATION: Chest pain  Referring M.D.:Kinza  Blood Pressure 121/52        Weight:60 kg     Height:157 cm       BSA 1.6 sq cm  Technician: MM    Dimensions:    LA 3.1       Normal Values: 2.0 - 4.0 cm    Ao 2.5        Normal Values: 2.0 - 3.8 cm  SEPTUM 0.9       Normal Values: 0.6 - 1.2 cm  PWT 0.8       Normal Values: 0.6 - 1.1 cm  LVIDd 3.8         Normal Values: 3.0 - 5.6 cm  LVIDs 2.7   Normal Values: 1.8 - 4.0 cm      OBSERVATIONS:    Mitral Valve: Calcified mitral annulus with normally opening mitral valve   leaflets. Trace mitral regurgitation.  Aortic Valve/Aorta: The aortic valve is not well visualized but likely   normal   Tricuspid Valve: Normal tricuspid valve. Trace tricuspid regurgitation.  Pulmonic Valve: The pulmonic valve is not well visualized. Probably   normal. Trace PI  Left Atrium: Mild left atrial enlargement  Right Atrium: Normal  Left Ventricle: Endocardium not well visualized. Overall there is   preserved left ventricular systolic function.. The EF is approximately   70-75%.  Right Ventricle: Normal right ventricular size and function.  Pericardium/Pleura: No pericardial effusion noted.  Pulmonary/RVPressure: The right ventricular systolic pressure is   estimated to be 62mmHg, assuming that the right atrial pressure is   estimated to be 3 mmHg. This is consistent with moderate to severe   pulmonary hypertension.  LV Diastolic Function: Normal diastolic function.    Conclusion: Overall normal left ventricular function. Moderate to severe   pulmonary hypertension. Normal right ventricular size and function.                  NAKUL GOMEZ M.D., ATTENDING CARDIOLOGIST  This document has been electronically signed. May 18 2017  2:19PM

## 2017-05-19 LAB
IGG SERPL-MCNC: 1100 MG/DL — SIGNIFICANT CHANGE UP (ref 767–1590)
IGG1 SER-MCNC: 651 MG/DL — SIGNIFICANT CHANGE UP (ref 341–894)
IGG2 SER-MCNC: 299 MG/DL — SIGNIFICANT CHANGE UP (ref 171–632)
IGG3 SER-MCNC: 44.4 MG/DL — SIGNIFICANT CHANGE UP (ref 18.4–106)
IGG4 SER-MCNC: 10.3 MG/DL — SIGNIFICANT CHANGE UP (ref 2.4–121)

## 2017-05-22 DIAGNOSIS — R00.0 TACHYCARDIA, UNSPECIFIED: ICD-10-CM

## 2017-05-22 DIAGNOSIS — E78.5 HYPERLIPIDEMIA, UNSPECIFIED: ICD-10-CM

## 2017-05-22 DIAGNOSIS — E05.90 THYROTOXICOSIS, UNSPECIFIED WITHOUT THYROTOXIC CRISIS OR STORM: ICD-10-CM

## 2017-05-22 DIAGNOSIS — K59.00 CONSTIPATION, UNSPECIFIED: ICD-10-CM

## 2017-05-22 DIAGNOSIS — I27.2 OTHER SECONDARY PULMONARY HYPERTENSION: ICD-10-CM

## 2017-05-22 DIAGNOSIS — K85.90 ACUTE PANCREATITIS WITHOUT NECROSIS OR INFECTION, UNSPECIFIED: ICD-10-CM

## 2017-05-22 DIAGNOSIS — E55.9 VITAMIN D DEFICIENCY, UNSPECIFIED: ICD-10-CM

## 2017-05-22 DIAGNOSIS — I10 ESSENTIAL (PRIMARY) HYPERTENSION: ICD-10-CM

## 2017-05-23 LAB — TSI ACT/NOR SER: 4.2 TSI INDEX — HIGH

## 2021-08-13 NOTE — H&P ADULT - NSHPPOASURGSITEINCISION_GEN_ALL_CORE
Cm was asked to submit order for a wheelchair despite a roller walker just being ordered for pt prior to admission to acute rehab  Family was made aware of the medicare guidelines pertaining to dme but thought pt's diagnosis would allow for both items to be covered  Wheelchair order placed with [employer], awaiting determination  Cm notified by [employer] that a w/c could be paid for by medicare after a walker purchase with supportive documentation  (?)  Cm provided therapy notes to continue process the order  no

## 2022-02-27 NOTE — ED PROVIDER NOTE - CPE EDP RESP NORM
CONSTITUTIONAL:  NAD  SKIN:  warm, dry  HEAD:  NCAT  EYES:  NL inspection  ENT: oropharynx clear; no rhinorrhea; MMM  NECK:  supple; non tender  CARD:  RRR  RESP:  + tachypnea, increased WOB, bilateral expiratory wheezing with decreased air movement; breath sounds equal bilaterally. Peak flow 350  ABD:  S/NT, no R/G  MSK:  no pedal edema  NEURO:  grossly unremarkable  PSYCH:  cooperative, appropriate normal...

## 2022-05-01 NOTE — DISCHARGE NOTE ADULT - IF YOU ARE A SMOKER, IT IS IMPORTANT FOR YOUR HEALTH TO STOP SMOKING. PLEASE BE AWARE THAT SECOND HAND SMOKE IS ALSO HARMFUL.
Problem: PAIN - ADULT  Goal: Verbalizes/displays adequate comfort level or baseline comfort level  Description: Interventions:  - Encourage patient to monitor pain and request assistance  - Assess pain using appropriate pain scale  - Administer analgesics based on type and severity of pain and evaluate response  - Implement non-pharmacological measures as appropriate and evaluate response  - Consider cultural and social influences on pain and pain management  - Notify physician/advanced practitioner if interventions unsuccessful or patient reports new pain  Outcome: Progressing     Problem: INFECTION - ADULT  Goal: Absence or prevention of progression during hospitalization  Description: INTERVENTIONS:  - Assess and monitor for signs and symptoms of infection  - Monitor lab/diagnostic results  - Monitor all insertion sites, i e  indwelling lines, tubes, and drains  - Monitor endotracheal if appropriate and nasal secretions for changes in amount and color  - [place] appropriate cooling/warming therapies per order  - Administer medications as ordered  - Instruct and encourage patient and family to use good hand hygiene technique  - Identify and instruct in appropriate isolation precautions for identified infection/condition  Outcome: Progressing     Problem: SAFETY ADULT  Goal: Patient will remain free of falls  Description: INTERVENTIONS:  - Educate patient/family on patient safety including physical limitations  - Instruct patient to call for assistance with activity   - Consult OT/PT to assist with strengthening/mobility   - Keep Call bell within reach  - Keep bed low and locked with side rails adjusted as appropriate  - Keep care items and personal belongings within reach  - Initiate and maintain comfort rounds  - Make Fall Risk Sign visible to staff  - Offer Toileting every 2 Hours, in advance of need  - Initiate/Maintain bed/chair alarm  - Apply yellow socks and bracelet for high fall risk patients  - Consider moving patient to room near nurses station  Outcome: Progressing  Goal: Maintain or return to baseline ADL function  Description: INTERVENTIONS:  -  Assess patient's ability to carry out ADLs; assess patient's baseline for ADL function and identify physical deficits which impact ability to perform ADLs (bathing, care of mouth/teeth, toileting, grooming, dressing, etc )  - Assess/evaluate cause of self-care deficits   - Assess range of motion  - Assess patient's mobility; develop plan if impaired  - Assess patient's need for assistive devices and provide as appropriate  - Encourage maximum independence but intervene and supervise when necessary  - Involve family in performance of ADLs  - Assess for home care needs following discharge   - Consider OT consult to assist with ADL evaluation and planning for discharge  - Provide patient education as appropriate  Outcome: Progressing  Goal: Maintains/Returns to pre admission functional level  Description: INTERVENTIONS:  - Perform BMAT or MOVE assessment daily    - Set and communicate daily mobility goal to care team and patient/family/caregiver     - Collaborate with rehabilitation services on mobility goals if consulted  - Stand patient 2 times a day  - Out of bed to chair 3 times a day   - Out of bed for meals 3 times a day  - Out of bed for toileting  - Record patient progress and toleration of activity level   Outcome: Progressing     Problem: DISCHARGE PLANNING  Goal: Discharge to home or other facility with appropriate resources  Description: INTERVENTIONS:  - Identify barriers to discharge w/patient and caregiver  - Arrange for needed discharge resources and transportation as appropriate  - Identify discharge learning needs (meds, wound care, etc )  - Arrange for interpretive services to assist at discharge as needed  - Refer to Case Management Department for coordinating discharge planning if the patient needs post-hospital services based on physician/advanced practitioner order or complex needs related to functional status, cognitive ability, or social support system  Outcome: Progressing     Problem: Knowledge Deficit  Goal: Patient/family/caregiver demonstrates understanding of disease process, treatment plan, medications, and discharge instructions  Description: Complete learning assessment and assess knowledge base  Interventions:  - Provide teaching at level of understanding  - Provide teaching via preferred learning methods  Outcome: Progressing     Problem: MOBILITY - ADULT  Goal: Maintain or return to baseline ADL function  Description: INTERVENTIONS:  -  Assess patient's ability to carry out ADLs; assess patient's baseline for ADL function and identify physical deficits which impact ability to perform ADLs (bathing, care of mouth/teeth, toileting, grooming, dressing, etc )  - Assess/evaluate cause of self-care deficits   - Assess range of motion  - Assess patient's mobility; develop plan if impaired  - Assess patient's need for assistive devices and provide as appropriate  - Encourage maximum independence but intervene and supervise when necessary  - Involve family in performance of ADLs  - Assess for home care needs following discharge   - Consider OT consult to assist with ADL evaluation and planning for discharge  - Provide patient education as appropriate  Outcome: Progressing  Goal: Maintains/Returns to pre admission functional level  Description: INTERVENTIONS:  - Perform BMAT or MOVE assessment daily    - Set and communicate daily mobility goal to care team and patient/family/caregiver     - Collaborate with rehabilitation services on mobility goals if consulted  - Stand patient 2 times a day  - Out of bed to chair 3 times a day   - Out of bed for meals 3 times a day  - Out of bed for toileting  - Record patient progress and toleration of activity level   Outcome: Progressing     Problem: Potential for Falls  Goal: Patient will remain free of falls  Description: INTERVENTIONS:  - Educate patient/family on patient safety including physical limitations  - Instruct patient to call for assistance with activity   - Consult OT/PT to assist with strengthening/mobility   - Keep Call bell within reach  - Keep bed low and locked with side rails adjusted as appropriate  - Keep care items and personal belongings within reach  - Initiate and maintain comfort rounds  - Make Fall Risk Sign visible to staff  - Offer Toileting every 2 Hours, in advance of need  - Initiate/Maintain bed/chair alarm  - Apply yellow socks and bracelet for high fall risk patients  - Consider moving patient to room near nurses station  Outcome: Progressing     Problem: SKIN/TISSUE INTEGRITY - ADULT  Goal: Incision(s), wounds(s) or drain site(s) healing without S/S of infection  Description: INTERVENTIONS  - Assess and document dressing, incision, wound bed, drain sites and surrounding tissue  - Provide patient and family education  - Perform skin care  Outcome: Progressing     Problem: MUSCULOSKELETAL - ADULT  Goal: Maintain or return mobility to safest level of function  Description: INTERVENTIONS:  - Assess patient's ability to carry out ADLs; assess patient's baseline for ADL function and identify physical deficits which impact ability to perform ADLs (bathing, care of mouth/teeth, toileting, grooming, dressing, etc )  - Assess/evaluate cause of self-care deficits   - Assess range of motion  - Assess patient's mobility  - Assess patient's need for assistive devices and provide as appropriate  - Encourage maximum independence but intervene and supervise when necessary  - Involve family in performance of ADLs  - Assess for home care needs following discharge   - Consider OT consult to assist with ADL evaluation and planning for discharge  - Provide patient education as appropriate  Outcome: Progressing Statement Selected

## 2022-08-03 ENCOUNTER — EMERGENCY (EMERGENCY)
Facility: HOSPITAL | Age: 87
LOS: 1 days | Discharge: HOME | End: 2022-08-03
Attending: STUDENT IN AN ORGANIZED HEALTH CARE EDUCATION/TRAINING PROGRAM
Payer: MEDICAID

## 2022-08-03 VITALS
HEART RATE: 88 BPM | SYSTOLIC BLOOD PRESSURE: 169 MMHG | OXYGEN SATURATION: 95 % | RESPIRATION RATE: 19 BRPM | TEMPERATURE: 99 F | DIASTOLIC BLOOD PRESSURE: 54 MMHG

## 2022-08-03 VITALS
OXYGEN SATURATION: 97 % | HEIGHT: 60 IN | RESPIRATION RATE: 20 BRPM | HEART RATE: 100 BPM | DIASTOLIC BLOOD PRESSURE: 81 MMHG | TEMPERATURE: 99 F | WEIGHT: 149.91 LBS | SYSTOLIC BLOOD PRESSURE: 192 MMHG

## 2022-08-03 DIAGNOSIS — Z86.69 PERSONAL HISTORY OF OTHER DISEASES OF THE NERVOUS SYSTEM AND SENSE ORGANS: Chronic | ICD-10-CM

## 2022-08-03 PROCEDURE — 99282 EMERGENCY DEPT VISIT SF MDM: CPT

## 2022-08-03 PROCEDURE — 99283 EMERGENCY DEPT VISIT LOW MDM: CPT

## 2022-08-03 RX ORDER — ACETAMINOPHEN 500 MG
975 TABLET ORAL ONCE
Refills: 0 | Status: COMPLETED | OUTPATIENT
Start: 2022-08-03 | End: 2022-08-03

## 2022-08-03 RX ADMIN — Medication 975 MILLIGRAM(S): at 12:55

## 2022-08-03 NOTE — ED ADULT NURSE NOTE - OBJECTIVE STATEMENT
86y female presents to the ER c/o myalgias and cough. pt is primarily creole speaking, family at bedside translating. as per family at bedside a son living in the house recently tested positive for covid. pt had similar symptoms of cough and myalgias x 5 days. pt tested positive for covid today. family states "I wanted her to get checked out since it has been going on for so long. pt vss, afebrile. pt well appearing, tolerating po. will reassess.

## 2022-08-03 NOTE — ED PROVIDER NOTE - ATTENDING CONTRIBUTION TO CARE
Attending (Jorge Alberto Delong D.O.):  I have personally seen and examined this patient. I have performed a substantive portion of the visit including all aspects of the medical decision making. Resident, fellow, and/or ACP note reviewed. I agree on the plan of care except where noted.    see mdm

## 2022-08-03 NOTE — ED ADULT TRIAGE NOTE - PAIN RATING/NUMBER SCALE (0-10): REST
0 Quality 110: Preventive Care And Screening: Influenza Immunization: Influenza Immunization Administered during Influenza season Detail Level: Detailed Quality 226: Preventive Care And Screening: Tobacco Use: Screening And Cessation Intervention: Patient screened for tobacco use and is an ex/non-smoker Quality 111:Pneumonia Vaccination Status For Older Adults: Pneumococcal Vaccination Previously Received

## 2022-08-03 NOTE — ED PROVIDER NOTE - OBJECTIVE STATEMENT
Patient is an 86 year old female with past medical history significant for hypertension presents to the Emergency Department with chief complaint of cough.  Patient diagnosed with COVID-19 4 days prior and has had a mild cough and body aches.  Patient describes symptoms as mild.  Patient reports relief with acetaminophen.  Patient denies any dyspnea, chest pain, fever, or other physical complaints.  Patient vaccinated x2 with Moderna vaccine.  No recent travel.

## 2022-08-03 NOTE — ED PROVIDER NOTE - NSFOLLOWUPINSTRUCTIONS_ED_ALL_ED_FT
YOU WERE SEEN IN THE ED FOR: cough    REST, STAY HYDRATED.    FOR PAIN/FEVER, YOU MAY TAKE TYLENOL (acetaminophen) AND/OR IBUPROFEN (advil or motrin). FOLLOW THE INSTRUCTIONS ON THE LABEL/CONTAINER.    PLEASE FOLLOW UP WITH YOUR PRIVATE PHYSICIAN WITHIN THE NEXT 48 HOURS. BRING COPIES OF YOUR RESULTS.    RETURN TO THE EMERGENCY DEPARTMENT IF YOU EXPERIENCE ANY NEW/CONCERNING/WORSENING SYMPTOMS.

## 2022-08-03 NOTE — ED ADULT NURSE NOTE - NSIMPLEMENTINTERV_GEN_ALL_ED
Implemented All Fall Risk Interventions:  [place] to call system. Call bell, personal items and telephone within reach. Instruct patient to call for assistance. Room bathroom lighting operational. Non-slip footwear when patient is off stretcher. Physically safe environment: no spills, clutter or unnecessary equipment. Stretcher in lowest position, wheels locked, appropriate side rails in place. Provide visual cue, wrist band, yellow gown, etc. Monitor gait and stability. Monitor for mental status changes and reorient to person, place, and time. Review medications for side effects contributing to fall risk. Reinforce activity limits and safety measures with patient and family.

## 2022-08-03 NOTE — ED PROVIDER NOTE - PATIENT PORTAL LINK FT
You can access the FollowMyHealth Patient Portal offered by [facility] by registering at the following website: [email or web address]. By joining [employer]’s FollowMyHealth portal, you will also be able to view your health information using other applications (apps) compatible with our system.

## 2022-08-03 NOTE — ED PROVIDER NOTE - NS ED ROS FT
CONSTITUTIONAL: No weakness, fevers or chills  EYES/ENT: No visual changes;  No vertigo or throat pain   NECK: No pain or stiffness  RESPIRATORY: + cough  CARDIOVASCULAR: No chest pain or palpitations  GASTROINTESTINAL: No abdominal or epigastric pain. No nausea, vomiting, or hematemesis; No diarrhea or constipation. No melena or hematochezia.  GENITOURINARY: No dysuria, frequency or hematuria  NEUROLOGICAL: No numbness or weakness  SKIN: No itching, burning, rashes, or lesions   All other review of systems is negative unless indicated above.

## 2022-09-07 PROBLEM — E28.319 ASYMPTOMATIC PREMATURE MENOPAUSE: Chronic | Status: ACTIVE | Noted: 2017-05-15

## 2022-09-07 PROBLEM — E78.5 HYPERLIPIDEMIA, UNSPECIFIED: Chronic | Status: ACTIVE | Noted: 2017-05-15

## 2022-09-07 PROBLEM — I10 ESSENTIAL (PRIMARY) HYPERTENSION: Chronic | Status: ACTIVE | Noted: 2017-05-15

## 2022-09-07 PROBLEM — H26.9 UNSPECIFIED CATARACT: Chronic | Status: ACTIVE | Noted: 2017-05-15

## 2023-03-24 NOTE — PROVIDER CONTACT NOTE (CRITICAL VALUE NOTIFICATION) - TEST AND RESULT REPORTED:
Troponin 0.052 Retention Suture Text: Retention sutures were placed to support the closure and prevent dehiscence.

## 2024-04-22 NOTE — ED PROVIDER NOTE - PRINCIPAL DIAGNOSIS
Called the pharmacy to see if they have some refills for the patient script of flexaril and they do not have any refills on file. Can you send the patient a new script?    COVID-19

## 2024-08-20 NOTE — ED PROVIDER NOTE - CLINICAL SUMMARY MEDICAL DECISION MAKING FREE TEXT BOX
Attending (Jorge Alberto Delong D.O.):  86F here for cough x 5 days, nonprod w/o chest pain, shortness of breath even with ambulation, tested + covid. Hemodynamically stable on repeat vitals, amb sat remains > 95% on RA. Clear lungs. Tolerating PO. Family concerned for PNA. Explained low risk of bacterial PNA at present time given clear lungs, vitals. Family and patient decline xray. Risks/benefits/alternatives discussed. Strict return precautions given with verbal understanding expressed. Stable for DC with close outpatient f/u No